# Patient Record
Sex: FEMALE | Race: WHITE | Employment: OTHER | URBAN - NONMETROPOLITAN AREA
[De-identification: names, ages, dates, MRNs, and addresses within clinical notes are randomized per-mention and may not be internally consistent; named-entity substitution may affect disease eponyms.]

---

## 2017-01-16 ENCOUNTER — ANTI-COAG VISIT (OUTPATIENT)
Dept: OTHER | Age: 67
End: 2017-01-16

## 2017-01-16 VITALS
BODY MASS INDEX: 31.16 KG/M2 | WEIGHT: 205.6 LBS | DIASTOLIC BLOOD PRESSURE: 65 MMHG | SYSTOLIC BLOOD PRESSURE: 118 MMHG | HEART RATE: 72 BPM

## 2017-01-16 DIAGNOSIS — I82.409 DVT, RECURRENT, LOWER EXTREMITY, ACUTE, UNSPECIFIED LATERALITY (HCC): ICD-10-CM

## 2017-01-16 LAB — POC INR: 1.2 (ref 0.8–1.2)

## 2017-01-16 PROCEDURE — 85610 PROTHROMBIN TIME: CPT | Performed by: NURSE PRACTITIONER

## 2017-01-16 PROCEDURE — 99999 PR OFFICE/OUTPT VISIT,PROCEDURE ONLY: CPT | Performed by: NURSE PRACTITIONER

## 2017-01-16 ASSESSMENT — ENCOUNTER SYMPTOMS
SHORTNESS OF BREATH: 0
DIARRHEA: 0
BLOOD IN STOOL: 0
CONSTIPATION: 0

## 2017-01-23 ENCOUNTER — ANTI-COAG VISIT (OUTPATIENT)
Dept: OTHER | Age: 67
End: 2017-01-23

## 2017-01-23 VITALS
HEART RATE: 67 BPM | SYSTOLIC BLOOD PRESSURE: 128 MMHG | DIASTOLIC BLOOD PRESSURE: 70 MMHG | BODY MASS INDEX: 31.25 KG/M2 | WEIGHT: 206.2 LBS

## 2017-01-23 DIAGNOSIS — I82.409 DVT, RECURRENT, LOWER EXTREMITY, ACUTE, UNSPECIFIED LATERALITY (HCC): ICD-10-CM

## 2017-01-23 LAB — POC INR: 2.7 (ref 0.8–1.2)

## 2017-01-23 PROCEDURE — 85610 PROTHROMBIN TIME: CPT | Performed by: NURSE PRACTITIONER

## 2017-01-23 PROCEDURE — 99999 PR OFFICE/OUTPT VISIT,PROCEDURE ONLY: CPT | Performed by: NURSE PRACTITIONER

## 2017-01-23 ASSESSMENT — ENCOUNTER SYMPTOMS
SHORTNESS OF BREATH: 0
BLOOD IN STOOL: 0
DIARRHEA: 0
CONSTIPATION: 0

## 2017-02-07 ENCOUNTER — ANTI-COAG VISIT (OUTPATIENT)
Dept: OTHER | Age: 67
End: 2017-02-07

## 2017-02-07 VITALS
SYSTOLIC BLOOD PRESSURE: 120 MMHG | BODY MASS INDEX: 31.46 KG/M2 | WEIGHT: 207.6 LBS | DIASTOLIC BLOOD PRESSURE: 67 MMHG | HEART RATE: 76 BPM

## 2017-02-07 DIAGNOSIS — I82.409 DVT, RECURRENT, LOWER EXTREMITY, ACUTE, UNSPECIFIED LATERALITY (HCC): ICD-10-CM

## 2017-02-07 DIAGNOSIS — R79.1 SUBTHERAPEUTIC INTERNATIONAL NORMALIZED RATIO (INR): Primary | ICD-10-CM

## 2017-02-07 LAB — POC INR: 1.7 (ref 0.8–1.2)

## 2017-02-07 PROCEDURE — 85610 PROTHROMBIN TIME: CPT | Performed by: NURSE PRACTITIONER

## 2017-02-07 PROCEDURE — 99212 OFFICE O/P EST SF 10 MIN: CPT | Performed by: NURSE PRACTITIONER

## 2017-02-07 ASSESSMENT — ENCOUNTER SYMPTOMS
DIARRHEA: 0
SHORTNESS OF BREATH: 0
CONSTIPATION: 0
BLOOD IN STOOL: 0

## 2017-02-28 ENCOUNTER — ANTI-COAG VISIT (OUTPATIENT)
Dept: OTHER | Age: 67
End: 2017-02-28

## 2017-02-28 VITALS
SYSTOLIC BLOOD PRESSURE: 124 MMHG | HEART RATE: 67 BPM | WEIGHT: 209.7 LBS | BODY MASS INDEX: 31.78 KG/M2 | DIASTOLIC BLOOD PRESSURE: 73 MMHG

## 2017-02-28 DIAGNOSIS — I82.409 DVT, RECURRENT, LOWER EXTREMITY, ACUTE, UNSPECIFIED LATERALITY (HCC): ICD-10-CM

## 2017-02-28 LAB — POC INR: 2.6 (ref 0.8–1.2)

## 2017-02-28 PROCEDURE — 99212 OFFICE O/P EST SF 10 MIN: CPT | Performed by: NURSE PRACTITIONER

## 2017-02-28 PROCEDURE — 85610 PROTHROMBIN TIME: CPT | Performed by: NURSE PRACTITIONER

## 2017-02-28 ASSESSMENT — ENCOUNTER SYMPTOMS
DIARRHEA: 0
SHORTNESS OF BREATH: 0
BLOOD IN STOOL: 0
CONSTIPATION: 0

## 2017-03-22 DIAGNOSIS — I82.409 DVT, RECURRENT, LOWER EXTREMITY, ACUTE, UNSPECIFIED LATERALITY (HCC): Primary | ICD-10-CM

## 2017-03-28 ENCOUNTER — ANTI-COAG VISIT (OUTPATIENT)
Dept: OTHER | Age: 67
End: 2017-03-28

## 2017-03-28 VITALS
HEART RATE: 80 BPM | WEIGHT: 205 LBS | SYSTOLIC BLOOD PRESSURE: 132 MMHG | DIASTOLIC BLOOD PRESSURE: 69 MMHG | BODY MASS INDEX: 31.07 KG/M2

## 2017-03-28 DIAGNOSIS — I82.409 DVT, RECURRENT, LOWER EXTREMITY, ACUTE, UNSPECIFIED LATERALITY (HCC): ICD-10-CM

## 2017-03-28 LAB
HCT VFR BLD CALC: 38.2 % (ref 37–47)
HEMOGLOBIN: 13 GM/DL (ref 12–16)
INR BLD: 4.45 (ref 0.85–1.13)

## 2017-03-28 ASSESSMENT — ENCOUNTER SYMPTOMS
CONSTIPATION: 0
SHORTNESS OF BREATH: 0
BLOOD IN STOOL: 0

## 2017-03-30 ENCOUNTER — ANTI-COAG VISIT (OUTPATIENT)
Dept: OTHER | Age: 67
End: 2017-03-30

## 2017-03-30 VITALS
WEIGHT: 204 LBS | HEART RATE: 68 BPM | DIASTOLIC BLOOD PRESSURE: 76 MMHG | BODY MASS INDEX: 30.92 KG/M2 | SYSTOLIC BLOOD PRESSURE: 152 MMHG

## 2017-03-30 DIAGNOSIS — I82.409 DVT, RECURRENT, LOWER EXTREMITY, ACUTE, UNSPECIFIED LATERALITY (HCC): ICD-10-CM

## 2017-03-30 LAB — POC INR: 3.5 (ref 0.8–1.2)

## 2017-03-30 ASSESSMENT — ENCOUNTER SYMPTOMS
CONSTIPATION: 0
SHORTNESS OF BREATH: 0
DIARRHEA: 0
BLOOD IN STOOL: 0

## 2017-04-06 ENCOUNTER — ANTI-COAG VISIT (OUTPATIENT)
Dept: OTHER | Age: 67
End: 2017-04-06

## 2017-04-06 VITALS
SYSTOLIC BLOOD PRESSURE: 129 MMHG | HEART RATE: 73 BPM | WEIGHT: 209.3 LBS | BODY MASS INDEX: 31.72 KG/M2 | DIASTOLIC BLOOD PRESSURE: 69 MMHG

## 2017-04-06 DIAGNOSIS — I82.409 DVT, RECURRENT, LOWER EXTREMITY, ACUTE, UNSPECIFIED LATERALITY (HCC): ICD-10-CM

## 2017-04-06 LAB — POC INR: 2.4 (ref 0.8–1.2)

## 2017-04-06 ASSESSMENT — ENCOUNTER SYMPTOMS: SHORTNESS OF BREATH: 1

## 2017-04-20 ENCOUNTER — ANTI-COAG VISIT (OUTPATIENT)
Dept: OTHER | Age: 67
End: 2017-04-20

## 2017-04-20 VITALS
SYSTOLIC BLOOD PRESSURE: 144 MMHG | DIASTOLIC BLOOD PRESSURE: 86 MMHG | HEART RATE: 74 BPM | BODY MASS INDEX: 31.1 KG/M2 | WEIGHT: 205.2 LBS

## 2017-04-20 DIAGNOSIS — I82.409 DVT, RECURRENT, LOWER EXTREMITY, ACUTE, UNSPECIFIED LATERALITY (HCC): ICD-10-CM

## 2017-04-20 LAB — POC INR: 2.9 (ref 0.8–1.2)

## 2017-04-20 ASSESSMENT — ENCOUNTER SYMPTOMS
SHORTNESS OF BREATH: 1
CONSTIPATION: 0
BLOOD IN STOOL: 0
DIARRHEA: 0

## 2017-05-11 ENCOUNTER — ANTI-COAG VISIT (OUTPATIENT)
Dept: OTHER | Age: 67
End: 2017-05-11

## 2017-05-11 VITALS
SYSTOLIC BLOOD PRESSURE: 139 MMHG | HEART RATE: 80 BPM | BODY MASS INDEX: 30.71 KG/M2 | DIASTOLIC BLOOD PRESSURE: 86 MMHG | WEIGHT: 202.6 LBS

## 2017-05-11 DIAGNOSIS — I82.409 DVT, RECURRENT, LOWER EXTREMITY, ACUTE, UNSPECIFIED LATERALITY (HCC): ICD-10-CM

## 2017-05-11 LAB — POC INR: 2.1 (ref 0.8–1.2)

## 2017-05-11 ASSESSMENT — ENCOUNTER SYMPTOMS
SHORTNESS OF BREATH: 0
DIARRHEA: 0
CONSTIPATION: 0

## 2017-06-01 ENCOUNTER — ANTI-COAG VISIT (OUTPATIENT)
Dept: OTHER | Age: 67
End: 2017-06-01

## 2017-06-01 VITALS
DIASTOLIC BLOOD PRESSURE: 69 MMHG | SYSTOLIC BLOOD PRESSURE: 126 MMHG | BODY MASS INDEX: 30.61 KG/M2 | HEART RATE: 79 BPM | WEIGHT: 202 LBS

## 2017-06-01 DIAGNOSIS — I82.409 DVT, RECURRENT, LOWER EXTREMITY, ACUTE, UNSPECIFIED LATERALITY (HCC): ICD-10-CM

## 2017-06-01 LAB — POC INR: 3 (ref 0.8–1.2)

## 2017-06-01 RX ORDER — TOPIRAMATE 25 MG/1
50 TABLET ORAL 2 TIMES DAILY
COMMUNITY

## 2017-06-01 ASSESSMENT — ENCOUNTER SYMPTOMS
BLOOD IN STOOL: 0
CONSTIPATION: 0
DIARRHEA: 0
SHORTNESS OF BREATH: 0

## 2017-06-29 ENCOUNTER — ANTI-COAG VISIT (OUTPATIENT)
Dept: OTHER | Age: 67
End: 2017-06-29

## 2017-06-29 VITALS
BODY MASS INDEX: 30.46 KG/M2 | WEIGHT: 201 LBS | SYSTOLIC BLOOD PRESSURE: 122 MMHG | HEART RATE: 70 BPM | DIASTOLIC BLOOD PRESSURE: 75 MMHG

## 2017-06-29 DIAGNOSIS — I82.409 DVT, RECURRENT, LOWER EXTREMITY, ACUTE, UNSPECIFIED LATERALITY (HCC): ICD-10-CM

## 2017-06-29 LAB — POC INR: 3.6 (ref 0.8–1.2)

## 2017-06-29 ASSESSMENT — ENCOUNTER SYMPTOMS
DIARRHEA: 0
SHORTNESS OF BREATH: 0
BLOOD IN STOOL: 0
CONSTIPATION: 0

## 2017-07-19 ENCOUNTER — HOSPITAL ENCOUNTER (OUTPATIENT)
Dept: PHARMACY | Age: 67
Setting detail: THERAPIES SERIES
Discharge: HOME | End: 2017-07-19
Payer: MEDICARE

## 2017-07-19 VITALS
HEART RATE: 69 BPM | BODY MASS INDEX: 30.92 KG/M2 | DIASTOLIC BLOOD PRESSURE: 72 MMHG | SYSTOLIC BLOOD PRESSURE: 135 MMHG | WEIGHT: 204 LBS

## 2017-07-19 DIAGNOSIS — I82.409 DVT, RECURRENT, LOWER EXTREMITY, ACUTE, UNSPECIFIED LATERALITY (HCC): ICD-10-CM

## 2017-07-19 LAB — POC INR: 3.2 (ref 0.8–1.2)

## 2017-07-19 PROCEDURE — 85610 PROTHROMBIN TIME: CPT

## 2017-07-19 PROCEDURE — 99211 OFF/OP EST MAY X REQ PHY/QHP: CPT

## 2017-07-19 PROCEDURE — 36416 COLLJ CAPILLARY BLOOD SPEC: CPT

## 2017-07-19 ASSESSMENT — ENCOUNTER SYMPTOMS
SHORTNESS OF BREATH: 0
DIARRHEA: 0
BLOOD IN STOOL: 0
CONSTIPATION: 0

## 2017-08-02 ENCOUNTER — HOSPITAL ENCOUNTER (OUTPATIENT)
Dept: PHARMACY | Age: 67
Setting detail: THERAPIES SERIES
Discharge: HOME | End: 2017-08-02
Payer: MEDICARE

## 2017-08-02 VITALS
SYSTOLIC BLOOD PRESSURE: 150 MMHG | WEIGHT: 201.8 LBS | DIASTOLIC BLOOD PRESSURE: 70 MMHG | BODY MASS INDEX: 30.58 KG/M2 | HEART RATE: 74 BPM

## 2017-08-02 DIAGNOSIS — I82.409 DVT, RECURRENT, LOWER EXTREMITY, ACUTE, UNSPECIFIED LATERALITY (HCC): ICD-10-CM

## 2017-08-02 LAB — POC INR: 2.7 (ref 0.8–1.2)

## 2017-08-02 PROCEDURE — 99211 OFF/OP EST MAY X REQ PHY/QHP: CPT

## 2017-08-02 PROCEDURE — 36416 COLLJ CAPILLARY BLOOD SPEC: CPT

## 2017-08-02 PROCEDURE — 85610 PROTHROMBIN TIME: CPT

## 2017-08-02 ASSESSMENT — ENCOUNTER SYMPTOMS
SHORTNESS OF BREATH: 0
DIARRHEA: 0
BLOOD IN STOOL: 0
CONSTIPATION: 0

## 2017-08-06 DIAGNOSIS — I82.409 DVT, RECURRENT, LOWER EXTREMITY, ACUTE, UNSPECIFIED LATERALITY (HCC): ICD-10-CM

## 2017-08-07 RX ORDER — WARFARIN SODIUM 4 MG/1
TABLET ORAL
Qty: 40 TABLET | Refills: 11 | Status: SHIPPED | OUTPATIENT
Start: 2017-08-07 | End: 2018-04-18 | Stop reason: SDUPTHER

## 2017-08-07 RX ORDER — WARFARIN SODIUM 4 MG/1
TABLET ORAL
Qty: 40 TABLET | Refills: 10 | OUTPATIENT
Start: 2017-08-07

## 2017-08-07 RX ORDER — WARFARIN SODIUM 3 MG/1
TABLET ORAL
Qty: 30 TABLET | Refills: 10 | OUTPATIENT
Start: 2017-08-07

## 2017-08-23 ENCOUNTER — HOSPITAL ENCOUNTER (OUTPATIENT)
Dept: PHARMACY | Age: 67
Setting detail: THERAPIES SERIES
Discharge: HOME | End: 2017-08-23
Payer: MEDICARE

## 2017-08-23 VITALS
WEIGHT: 202.8 LBS | SYSTOLIC BLOOD PRESSURE: 137 MMHG | BODY MASS INDEX: 30.74 KG/M2 | DIASTOLIC BLOOD PRESSURE: 75 MMHG | HEART RATE: 80 BPM

## 2017-08-23 DIAGNOSIS — I82.409 DVT, RECURRENT, LOWER EXTREMITY, ACUTE, UNSPECIFIED LATERALITY (HCC): ICD-10-CM

## 2017-08-23 LAB — POC INR: 4 (ref 0.8–1.2)

## 2017-08-23 PROCEDURE — 36416 COLLJ CAPILLARY BLOOD SPEC: CPT

## 2017-08-23 PROCEDURE — 85610 PROTHROMBIN TIME: CPT

## 2017-08-23 PROCEDURE — 99211 OFF/OP EST MAY X REQ PHY/QHP: CPT

## 2017-08-23 ASSESSMENT — ENCOUNTER SYMPTOMS
CONSTIPATION: 0
SHORTNESS OF BREATH: 0
BLOOD IN STOOL: 0
DIARRHEA: 0

## 2017-09-06 ENCOUNTER — HOSPITAL ENCOUNTER (OUTPATIENT)
Dept: PHARMACY | Age: 67
Setting detail: THERAPIES SERIES
Discharge: HOME | End: 2017-09-06
Payer: MEDICARE

## 2017-09-06 VITALS
SYSTOLIC BLOOD PRESSURE: 131 MMHG | WEIGHT: 201.6 LBS | HEART RATE: 72 BPM | DIASTOLIC BLOOD PRESSURE: 69 MMHG | BODY MASS INDEX: 30.55 KG/M2

## 2017-09-06 DIAGNOSIS — I82.409 DVT, RECURRENT, LOWER EXTREMITY, ACUTE, UNSPECIFIED LATERALITY (HCC): ICD-10-CM

## 2017-09-06 LAB — POC INR: 2.3 (ref 0.8–1.2)

## 2017-09-06 PROCEDURE — 99211 OFF/OP EST MAY X REQ PHY/QHP: CPT

## 2017-09-06 PROCEDURE — 85610 PROTHROMBIN TIME: CPT

## 2017-09-06 PROCEDURE — 36416 COLLJ CAPILLARY BLOOD SPEC: CPT

## 2017-09-27 ENCOUNTER — HOSPITAL ENCOUNTER (OUTPATIENT)
Dept: PHARMACY | Age: 67
Setting detail: THERAPIES SERIES
Discharge: HOME | End: 2017-09-27
Payer: MEDICARE

## 2017-09-27 VITALS
SYSTOLIC BLOOD PRESSURE: 119 MMHG | BODY MASS INDEX: 30.34 KG/M2 | WEIGHT: 200.2 LBS | DIASTOLIC BLOOD PRESSURE: 66 MMHG | HEART RATE: 68 BPM

## 2017-09-27 DIAGNOSIS — I82.409 DVT, RECURRENT, LOWER EXTREMITY, ACUTE, UNSPECIFIED LATERALITY (HCC): ICD-10-CM

## 2017-09-27 LAB
HCT VFR BLD CALC: 39.2 % (ref 37–47)
HEMOGLOBIN: 13.3 GM/DL (ref 12–16)
POC INR: 1.9 (ref 0.8–1.2)

## 2017-09-27 PROCEDURE — 99211 OFF/OP EST MAY X REQ PHY/QHP: CPT

## 2017-09-27 PROCEDURE — 36416 COLLJ CAPILLARY BLOOD SPEC: CPT

## 2017-09-27 PROCEDURE — 85610 PROTHROMBIN TIME: CPT

## 2017-10-11 ENCOUNTER — HOSPITAL ENCOUNTER (OUTPATIENT)
Dept: PHARMACY | Age: 67
Setting detail: THERAPIES SERIES
Discharge: HOME | End: 2017-10-11
Payer: MEDICARE

## 2017-10-11 VITALS
DIASTOLIC BLOOD PRESSURE: 71 MMHG | WEIGHT: 200 LBS | BODY MASS INDEX: 30.31 KG/M2 | SYSTOLIC BLOOD PRESSURE: 117 MMHG | HEART RATE: 79 BPM

## 2017-10-11 DIAGNOSIS — I82.409 DVT, RECURRENT, LOWER EXTREMITY, ACUTE, UNSPECIFIED LATERALITY (HCC): ICD-10-CM

## 2017-10-11 LAB — POC INR: 1.9 (ref 0.8–1.2)

## 2017-10-11 PROCEDURE — 85610 PROTHROMBIN TIME: CPT

## 2017-10-11 PROCEDURE — 99211 OFF/OP EST MAY X REQ PHY/QHP: CPT

## 2017-10-11 PROCEDURE — 36416 COLLJ CAPILLARY BLOOD SPEC: CPT

## 2017-10-25 ENCOUNTER — HOSPITAL ENCOUNTER (OUTPATIENT)
Dept: PHARMACY | Age: 67
Setting detail: THERAPIES SERIES
Discharge: HOME | End: 2017-10-25
Payer: MEDICARE

## 2017-10-25 VITALS
HEART RATE: 72 BPM | SYSTOLIC BLOOD PRESSURE: 134 MMHG | WEIGHT: 198.8 LBS | TEMPERATURE: 96.7 F | DIASTOLIC BLOOD PRESSURE: 77 MMHG | BODY MASS INDEX: 30.13 KG/M2

## 2017-10-25 DIAGNOSIS — I82.409 RECURRENT ACUTE DEEP VEIN THROMBOSIS (DVT) OF LOWER EXTREMITY, UNSPECIFIED LATERALITY (HCC): ICD-10-CM

## 2017-10-25 LAB — POC INR: 1.9 (ref 0.8–1.2)

## 2017-10-25 PROCEDURE — 36416 COLLJ CAPILLARY BLOOD SPEC: CPT

## 2017-10-25 PROCEDURE — G0463 HOSPITAL OUTPT CLINIC VISIT: HCPCS

## 2017-10-25 PROCEDURE — 90686 IIV4 VACC NO PRSV 0.5 ML IM: CPT | Performed by: INTERNAL MEDICINE

## 2017-10-25 PROCEDURE — 6360000002 HC RX W HCPCS: Performed by: INTERNAL MEDICINE

## 2017-10-25 PROCEDURE — G0008 ADMIN INFLUENZA VIRUS VAC: HCPCS | Performed by: INTERNAL MEDICINE

## 2017-10-25 PROCEDURE — 85610 PROTHROMBIN TIME: CPT

## 2017-10-25 RX ADMIN — INFLUENZA A VIRUS A/SINGAPORE/GP1908/2015 IVR-180A (H1N1) ANTIGEN (PROPIOLACTONE INACTIVATED), INFLUENZA A VIRUS A/HONG KONG/4801/2014 X-263B (H3N2) ANTIGEN (PROPIOLACTONE INACTIVATED), INFLUENZA B VIRUS B/BRISBANE/46/2015 ANTIGEN (PROPIOLACTONE INACTIVATED), AND INFLUENZA B VIRUS B/PHUKET/3073/2013 BVR-1B ANTIGEN (PROPIOLACTONE INACTIVATED) 0.5 ML: 15; 15; 15; 15 INJECTION, SUSPENSION INTRAMUSCULAR at 11:37

## 2017-10-25 NOTE — PROGRESS NOTES
The Medication Management [facility]  Anticoagulation Clinic  [phone] (phone)                       [phone] (fax)    Visit Date: 10/25/2017     Subjective:     Patient presents for 2 week INR check. Patient in for anticoagulation management due to DVT, PE and noted chronic lacunar infarcts on MRI of brain with a goal INR of 2.0-3.0. INR ordered and reviewed today. Patient reports the following:    Medication changes: no  Tablet strength per patient: 3mg, 4mg  Patient reported dosing regimen over last 1 week: 8mg daily  Missed doses in the last 1-2 weeks: no  Extra doses in the last 1-2 weeks: no  Any problems with bleeding/bruising? No  Any recent falls? No   Any signs or symptoms of DVT/PE or stroke? No  Alcohol use: no  Tobacco use: no  Diet changes as follows: No changes  Upcoming surgeries or procedures: no  Appointment preference: AM around 11AM     Review of Systems   Constitutional: Negative for activity change, appetite change and fatigue. HENT: Negative for nosebleeds. Respiratory: Negative for shortness of breath. Cardiovascular: Negative for chest pain and leg swelling. Gastrointestinal: Negative for blood in stool, constipation and diarrhea. Genitourinary: Negative for hematuria. Neurological: Negative for weakness, light-headedness and headaches. Hematological: Does not bruise/bleed easily. Objective:   Physical Exam   Vitals:    10/25/17 1104   BP: 134/77   Pulse: 72   Temp: 96.7 °F (35.9 °C)     H&H reviewed    Physical Exam    Assessment:     Lab Results   Component Value Date    INR 1.90 (H) 10/25/2017    INR 1.90 (H) 10/11/2017    INR 1.90 (H) 09/27/2017     INR subtherapeutic   Recent Labs      10/25/17   1100   INR  1.90*             Plan:   Increase Coumadin 10 mg MF, 8 mg TWThSaS. Recheck INR 2 weeks. Patient reminded to call the Anticoagulation Clinic with signs or symptoms of bleeding or with any medication changes.  Patient given instructions utilizing the teach back method. After obtaining consent, Influenza vaccine given by Wang Hudson. Patient instructed to remain in clinic for 20 minutes afterwards, and to report any adverse reaction to staff immediately. [facility] Vaccine Information Sheet given to patient for immunization(s) administered. Patient tolerated well, please see immunization note. Discharged ambulatory in no apparent distress.

## 2017-11-02 ENCOUNTER — TELEPHONE (OUTPATIENT)
Dept: PHARMACY | Age: 67
End: 2017-11-02

## 2017-11-08 ENCOUNTER — HOSPITAL ENCOUNTER (OUTPATIENT)
Dept: PHARMACY | Age: 67
Setting detail: THERAPIES SERIES
Discharge: HOME | End: 2017-11-08
Payer: MEDICARE

## 2017-11-08 VITALS
BODY MASS INDEX: 30.04 KG/M2 | DIASTOLIC BLOOD PRESSURE: 73 MMHG | WEIGHT: 198.2 LBS | HEART RATE: 66 BPM | SYSTOLIC BLOOD PRESSURE: 137 MMHG

## 2017-11-08 DIAGNOSIS — I82.409 RECURRENT ACUTE DEEP VEIN THROMBOSIS (DVT) OF LOWER EXTREMITY, UNSPECIFIED LATERALITY (HCC): ICD-10-CM

## 2017-11-08 LAB — POC INR: 2.8 (ref 0.8–1.2)

## 2017-11-08 PROCEDURE — 99211 OFF/OP EST MAY X REQ PHY/QHP: CPT

## 2017-11-08 PROCEDURE — 85610 PROTHROMBIN TIME: CPT

## 2017-11-08 PROCEDURE — 36416 COLLJ CAPILLARY BLOOD SPEC: CPT

## 2017-11-08 ASSESSMENT — ENCOUNTER SYMPTOMS
DIARRHEA: 0
BLOOD IN STOOL: 0
CONSTIPATION: 0
SHORTNESS OF BREATH: 0

## 2017-11-08 NOTE — PROGRESS NOTES
The Medication Management [facility]  Anticoagulation Clinic  [phone] (phone)           [phone] (fax)    Visit Date: 11/8/2017     Subjective:       Patient ID: Stevie Peterson, 78 y.o., female    HPI  Patient seen in clinic for anticoagulation management due to recurrent DVT with a goal INR of 2.0-3.0. Patient last seen 2 week(s) ago. INR ordered and reviewed today. Patient denies any new Rx medications. Patient denies any OTC medications or products. Patient denies any missed doses. Patient denies change in diet. Patient denies change in tobacco/alcohol use. Patient denies upcoming surgeries. Review of Systems   Constitutional: Negative for activity change, appetite change and fatigue. HENT: Negative for nosebleeds. Respiratory: Negative for shortness of breath. Cardiovascular: Negative for chest pain and leg swelling. Gastrointestinal: Negative for blood in stool, constipation and diarrhea. Genitourinary: Negative for hematuria. Neurological: Negative for weakness, light-headedness and headaches. Hematological: Does not bruise/bleed easily.        Objective:   Physical Exam   Vitals:    11/08/17 1336   BP: 137/73   Pulse: 66       Physical Exam    Assessment:      Lab Results   Component Value Date    INR 2.80 (H) 11/08/2017    INR 1.90 (H) 10/25/2017    INR 1.90 (H) 10/11/2017     INR therapeutic   Recent Labs      11/08/17   1335   INR  2.80*                             Plan:    Continue Coumadin 10 mg MF, 8 mg TWThSaS. Recheck INR 4 weeks. Patient reminded to call the Anticoagulation Clinic with any signs or symptoms of bleeding or with any medication changes. Patient given instructions utilizing the teach back method. Discharged ambulatory in no apparent distress.

## 2017-12-06 ENCOUNTER — HOSPITAL ENCOUNTER (OUTPATIENT)
Dept: PHARMACY | Age: 67
Setting detail: THERAPIES SERIES
Discharge: HOME | End: 2017-12-06
Payer: MEDICARE

## 2017-12-06 DIAGNOSIS — I82.409 RECURRENT ACUTE DEEP VEIN THROMBOSIS (DVT) OF LOWER EXTREMITY, UNSPECIFIED LATERALITY (HCC): ICD-10-CM

## 2017-12-06 LAB — POC INR: 2.2 (ref 0.8–1.2)

## 2017-12-06 PROCEDURE — 36416 COLLJ CAPILLARY BLOOD SPEC: CPT

## 2017-12-06 PROCEDURE — 99211 OFF/OP EST MAY X REQ PHY/QHP: CPT

## 2017-12-06 PROCEDURE — 85610 PROTHROMBIN TIME: CPT

## 2017-12-06 ASSESSMENT — ENCOUNTER SYMPTOMS
DIARRHEA: 0
BLOOD IN STOOL: 0
CONSTIPATION: 0
SHORTNESS OF BREATH: 0

## 2017-12-06 NOTE — PROGRESS NOTES
The Medication Management [facility]  Anticoagulation Clinic  [phone] (phone)           [phone] (fax)    Visit Date: 12/6/2017     Subjective:       Patient ID: Анна Arteaga, 75 y.o., female    HPI  Patient seen in clinic for Warfarin management due to recurrent DVT, per Dr. [facility] referral (4 week visit). Correctly identified tablets; follows printed instructions for dose. Missed a dose 1 day last week and week before - unsure what days. Patient denies any new Rx medications. Patient denies any OTC medications or products. Patient denies change in diet. Patient denies change in tobacco/alcohol use. Patient denies upcoming surgeries. Fell onto face last week into mulch. Reminded to go to ER after any head trauma. States is having more balance trouble. Review of Systems   Constitutional: Negative for activity change, appetite change and fatigue. HENT: Negative for nosebleeds. Respiratory: Negative for shortness of breath. Cardiovascular: Positive for chest pain (a few episodes left chest, slight, can last 2 hours. Advised to notify doctor. ). Leg swelling: left, usual.   Gastrointestinal: Negative for blood in stool, constipation and diarrhea. Genitourinary: Negative for hematuria. Neurological: Positive for dizziness (one day). Negative for weakness, light-headedness and headaches. Hematological: Does not bruise/bleed easily.        Objective:         Physical Exam   Constitutional: She is oriented to person, place, and time. She appears well-developed and well-nourished. Pulmonary/Chest: Effort normal.   Neurological: She is alert and oriented to person, place, and time. Skin: Skin is warm and dry. Psychiatric: She has a normal mood and affect. Her behavior is normal.       Assessment:      Lab Results   Component Value Date    INR 2.20 (H) 12/06/2017    INR 2.80 (H) 11/08/2017    INR 1.90 (H) 10/25/2017     INR therapeutic  - goal 2-3.   Recent Labs      12/06/17   1135   INR  2.20*                             Plan:   POCT INR ordered/perfomed/reviewed. Continue PO Coumadin 10 mg MF, 8 mg TWThSS. Recheck INR 4 weeks. (Report given - orders entered by RHETT Velasquez, PharmD.)  Patient reminded to call the Anticoagulation Clinic with any signs or symptoms of bleeding or with any medication changes. Patient given instructions utilizing the teach back method. Discharged ambulatory in no apparent distress. Frequency of office visits not marked on referral.  Called Dr. Dominic Deal office - spoke with nurse, Juan Pablo Burciaga. She advised every 6 months. Referral so marked.

## 2018-01-29 ENCOUNTER — TELEPHONE (OUTPATIENT)
Dept: PULMONOLOGY | Age: 68
End: 2018-01-29

## 2018-01-31 ENCOUNTER — HOSPITAL ENCOUNTER (OUTPATIENT)
Dept: PHARMACY | Age: 68
Setting detail: THERAPIES SERIES
Discharge: HOME | End: 2018-01-31
Payer: MEDICARE

## 2018-01-31 DIAGNOSIS — I82.409 RECURRENT ACUTE DEEP VEIN THROMBOSIS (DVT) OF LOWER EXTREMITY, UNSPECIFIED LATERALITY (HCC): ICD-10-CM

## 2018-01-31 LAB — POC INR: 2.2 (ref 0.8–1.2)

## 2018-01-31 PROCEDURE — 36416 COLLJ CAPILLARY BLOOD SPEC: CPT

## 2018-01-31 PROCEDURE — 85610 PROTHROMBIN TIME: CPT

## 2018-01-31 PROCEDURE — 99211 OFF/OP EST MAY X REQ PHY/QHP: CPT

## 2018-02-28 ENCOUNTER — HOSPITAL ENCOUNTER (OUTPATIENT)
Dept: PHARMACY | Age: 68
Setting detail: THERAPIES SERIES
Discharge: HOME | End: 2018-02-28
Payer: MEDICARE

## 2018-02-28 DIAGNOSIS — I82.409 RECURRENT ACUTE DEEP VEIN THROMBOSIS (DVT) OF LOWER EXTREMITY, UNSPECIFIED LATERALITY (HCC): ICD-10-CM

## 2018-02-28 LAB — POC INR: 3.2 (ref 0.8–1.2)

## 2018-02-28 PROCEDURE — 36416 COLLJ CAPILLARY BLOOD SPEC: CPT

## 2018-02-28 PROCEDURE — 85610 PROTHROMBIN TIME: CPT

## 2018-02-28 PROCEDURE — 99211 OFF/OP EST MAY X REQ PHY/QHP: CPT

## 2018-03-28 ENCOUNTER — HOSPITAL ENCOUNTER (OUTPATIENT)
Age: 68
Discharge: HOME | End: 2018-03-28
Payer: MEDICARE

## 2018-03-28 ENCOUNTER — HOSPITAL ENCOUNTER (OUTPATIENT)
Dept: PHARMACY | Age: 68
Setting detail: THERAPIES SERIES
Discharge: HOME | End: 2018-03-28
Payer: MEDICARE

## 2018-03-28 DIAGNOSIS — Z79.01 ANTICOAGULATED ON COUMADIN: ICD-10-CM

## 2018-03-28 DIAGNOSIS — I82.409 RECURRENT ACUTE DEEP VEIN THROMBOSIS (DVT) OF LOWER EXTREMITY, UNSPECIFIED LATERALITY (HCC): ICD-10-CM

## 2018-03-28 DIAGNOSIS — Z79.01 ANTICOAGULATED ON COUMADIN: Primary | ICD-10-CM

## 2018-03-28 LAB
HCT VFR BLD CALC: 38.7 % (ref 37–47)
HEMOGLOBIN: 13 GM/DL (ref 12–16)
POC INR: 1.8 (ref 0.8–1.2)

## 2018-03-28 PROCEDURE — 36415 COLL VENOUS BLD VENIPUNCTURE: CPT

## 2018-03-28 PROCEDURE — 36416 COLLJ CAPILLARY BLOOD SPEC: CPT

## 2018-03-28 PROCEDURE — 99211 OFF/OP EST MAY X REQ PHY/QHP: CPT

## 2018-03-28 PROCEDURE — 85014 HEMATOCRIT: CPT

## 2018-03-28 PROCEDURE — 85018 HEMOGLOBIN: CPT

## 2018-03-28 PROCEDURE — 85610 PROTHROMBIN TIME: CPT

## 2018-04-18 ENCOUNTER — HOSPITAL ENCOUNTER (OUTPATIENT)
Dept: PHARMACY | Age: 68
Setting detail: THERAPIES SERIES
Discharge: HOME | End: 2018-04-18
Payer: MEDICARE

## 2018-04-18 DIAGNOSIS — I82.409 RECURRENT ACUTE DEEP VEIN THROMBOSIS (DVT) OF LOWER EXTREMITY, UNSPECIFIED LATERALITY (HCC): ICD-10-CM

## 2018-04-18 DIAGNOSIS — I82.409 DVT, RECURRENT, LOWER EXTREMITY, ACUTE, UNSPECIFIED LATERALITY (HCC): ICD-10-CM

## 2018-04-18 LAB — POC INR: 1.8 (ref 0.8–1.2)

## 2018-04-18 PROCEDURE — 36416 COLLJ CAPILLARY BLOOD SPEC: CPT | Performed by: PHARMACIST

## 2018-04-18 PROCEDURE — 99211 OFF/OP EST MAY X REQ PHY/QHP: CPT | Performed by: PHARMACIST

## 2018-04-18 PROCEDURE — 85610 PROTHROMBIN TIME: CPT | Performed by: PHARMACIST

## 2018-04-18 RX ORDER — WARFARIN SODIUM 4 MG/1
TABLET ORAL
Qty: 60 TABLET | Refills: 11 | Status: SHIPPED | OUTPATIENT
Start: 2018-04-18 | End: 2019-04-10 | Stop reason: SDUPTHER

## 2018-05-23 ENCOUNTER — HOSPITAL ENCOUNTER (OUTPATIENT)
Dept: PHARMACY | Age: 68
Setting detail: THERAPIES SERIES
Discharge: HOME | End: 2018-05-23
Payer: MEDICARE

## 2018-05-23 DIAGNOSIS — I82.409 RECURRENT ACUTE DEEP VEIN THROMBOSIS (DVT) OF LOWER EXTREMITY, UNSPECIFIED LATERALITY (HCC): ICD-10-CM

## 2018-05-23 LAB — POC INR: 2.5 (ref 0.8–1.2)

## 2018-05-23 PROCEDURE — 85610 PROTHROMBIN TIME: CPT

## 2018-05-23 PROCEDURE — 36416 COLLJ CAPILLARY BLOOD SPEC: CPT

## 2018-05-23 PROCEDURE — 99211 OFF/OP EST MAY X REQ PHY/QHP: CPT

## 2018-05-23 RX ORDER — WARFARIN SODIUM 3 MG/1
TABLET ORAL DAILY
COMMUNITY
End: 2019-02-12 | Stop reason: SDUPTHER

## 2018-09-10 ENCOUNTER — HOSPITAL ENCOUNTER (OUTPATIENT)
Dept: PHARMACY | Age: 68
Setting detail: THERAPIES SERIES
Discharge: HOME | End: 2018-09-10
Payer: MEDICARE

## 2018-09-10 DIAGNOSIS — I82.409 RECURRENT ACUTE DEEP VEIN THROMBOSIS (DVT) OF LOWER EXTREMITY, UNSPECIFIED LATERALITY (HCC): ICD-10-CM

## 2018-09-10 LAB — POC INR: 4 (ref 0.8–1.2)

## 2018-09-10 PROCEDURE — 99211 OFF/OP EST MAY X REQ PHY/QHP: CPT

## 2018-09-10 PROCEDURE — 85610 PROTHROMBIN TIME: CPT

## 2018-09-10 PROCEDURE — 36416 COLLJ CAPILLARY BLOOD SPEC: CPT

## 2018-09-24 ENCOUNTER — HOSPITAL ENCOUNTER (OUTPATIENT)
Dept: PHARMACY | Age: 68
Setting detail: THERAPIES SERIES
Discharge: HOME | End: 2018-09-24
Payer: MEDICARE

## 2018-09-24 DIAGNOSIS — I82.409 RECURRENT ACUTE DEEP VEIN THROMBOSIS (DVT) OF LOWER EXTREMITY, UNSPECIFIED LATERALITY (HCC): ICD-10-CM

## 2018-09-24 LAB — POC INR: 1.8 (ref 0.8–1.2)

## 2018-09-24 PROCEDURE — G0008 ADMIN INFLUENZA VIRUS VAC: HCPCS | Performed by: INTERNAL MEDICINE

## 2018-09-24 PROCEDURE — 90686 IIV4 VACC NO PRSV 0.5 ML IM: CPT | Performed by: INTERNAL MEDICINE

## 2018-09-24 PROCEDURE — 6360000002 HC RX W HCPCS: Performed by: INTERNAL MEDICINE

## 2018-09-24 PROCEDURE — 36416 COLLJ CAPILLARY BLOOD SPEC: CPT | Performed by: PHARMACIST

## 2018-09-24 PROCEDURE — G0463 HOSPITAL OUTPT CLINIC VISIT: HCPCS | Performed by: PHARMACIST

## 2018-09-24 PROCEDURE — 85610 PROTHROMBIN TIME: CPT | Performed by: PHARMACIST

## 2018-09-24 RX ORDER — OXYBUTYNIN CHLORIDE 5 MG/1
5 TABLET ORAL 3 TIMES DAILY
COMMUNITY

## 2018-09-24 RX ADMIN — INFLUENZA A VIRUS A/MICHIGAN/45/2015 X-275 (H1N1) ANTIGEN (FORMALDEHYDE INACTIVATED), INFLUENZA A VIRUS A/SINGAPORE/INFIMH-16-0019/2016 IVR-186 (H3N2) ANTIGEN (FORMALDEHYDE INACTIVATED), INFLUENZA B VIRUS B/PHUKET/3073/2013 ANTIGEN (FORMALDEHYDE INACTIVATED), AND INFLUENZA B VIRUS B/MARYLAND/15/2016 BX-69A ANTIGEN (FORMALDEHYDE INACTIVATED) 0.5 ML: 15; 15; 15; 15 INJECTION, SUSPENSION INTRAMUSCULAR at 11:47

## 2018-09-24 NOTE — PROGRESS NOTES
Medication Management [facility]  Anticoagulation Clinic  [phone] (phone)  [phone] (fax)      Ms. Yoshi Munoz is a 75 y.o.  female with history of DVT who presents today for anticoagulation monitoring and adjustment. Patient verifies current dosing regimen and tablet strength. No missed or extra doses. Patient denies s/s bleeding/bruising/swelling/SOB/chest pain  - More bruising than usual.  No blood in urine or stool. No dietary changes. No changes in medication/OTC agents/Herbals. No change in alcohol use or tobacco use. No change in activity level. Patient denies headaches/dizziness/lightheadedness/falls. No vomiting/diarrhea or acute illness. No Procedures scheduled in the future at this time. Assessment:   Lab Results   Component Value Date    INR 1.80 (H) 09/24/2018    INR 4.00 (H) 09/10/2018    INR 2.50 (H) 05/23/2018     INR subtherapeutic   Recent Labs      09/24/18   1103   INR  1.80*         Plan:  Coumadin 12mg today, then continue Coumadin 8mg MWF and 6mg TThSaS . Recheck INR 3 weeks. Fluzone given as per pt request. Patient reminded to call the Anticoagulation Clinic with any signs or symptoms of bleeding or with any medication changes. Patient given instructions utilizing the teach back method. Discharged ambulatory in no apparent distress. After visit summary printed and reviewed with patient.       Medications reviewed and updated on home medication list Yes    Influenza vaccine:     [x] given    [] declined   [] received previously   [] plans to receive at a later time   [] refused    [x] documented in EPIC

## 2018-11-26 ENCOUNTER — HOSPITAL ENCOUNTER (OUTPATIENT)
Dept: PHARMACY | Age: 68
Setting detail: THERAPIES SERIES
Discharge: HOME | End: 2018-11-26
Payer: MEDICARE

## 2018-11-26 DIAGNOSIS — I82.409 RECURRENT ACUTE DEEP VEIN THROMBOSIS (DVT) OF LOWER EXTREMITY, UNSPECIFIED LATERALITY (HCC): ICD-10-CM

## 2018-11-26 LAB — POC INR: 2.5 (ref 0.8–1.2)

## 2018-11-26 PROCEDURE — 99211 OFF/OP EST MAY X REQ PHY/QHP: CPT | Performed by: PHARMACIST

## 2018-11-26 PROCEDURE — 36416 COLLJ CAPILLARY BLOOD SPEC: CPT | Performed by: PHARMACIST

## 2018-11-26 PROCEDURE — 85610 PROTHROMBIN TIME: CPT | Performed by: PHARMACIST

## 2019-01-03 ENCOUNTER — HOSPITAL ENCOUNTER (OUTPATIENT)
Dept: PHARMACY | Age: 69
Setting detail: THERAPIES SERIES
Discharge: HOME | End: 2019-01-03
Payer: MEDICARE

## 2019-01-03 DIAGNOSIS — I82.409 RECURRENT ACUTE DEEP VEIN THROMBOSIS (DVT) OF LOWER EXTREMITY, UNSPECIFIED LATERALITY (HCC): ICD-10-CM

## 2019-01-03 LAB — POC INR: 1.6 (ref 0.8–1.2)

## 2019-01-03 PROCEDURE — 99211 OFF/OP EST MAY X REQ PHY/QHP: CPT

## 2019-01-03 PROCEDURE — 85610 PROTHROMBIN TIME: CPT

## 2019-01-03 PROCEDURE — 36416 COLLJ CAPILLARY BLOOD SPEC: CPT

## 2019-01-30 DIAGNOSIS — I82.409 RECURRENT DEEP VEIN THROMBOSIS (DVT) (HCC): Primary | ICD-10-CM

## 2019-01-30 PROBLEM — N32.81 OVERACTIVE BLADDER: Status: ACTIVE | Noted: 2017-05-03

## 2019-02-08 ENCOUNTER — HOSPITAL ENCOUNTER (OUTPATIENT)
Dept: PHARMACY | Age: 69
Setting detail: THERAPIES SERIES
End: 2019-02-08
Payer: MEDICARE

## 2019-02-12 ENCOUNTER — HOSPITAL ENCOUNTER (OUTPATIENT)
Dept: PHARMACY | Age: 69
Setting detail: THERAPIES SERIES
Discharge: HOME | End: 2019-02-12
Payer: MEDICARE

## 2019-02-12 DIAGNOSIS — I82.409 RECURRENT ACUTE DEEP VEIN THROMBOSIS (DVT) OF LOWER EXTREMITY, UNSPECIFIED LATERALITY (HCC): ICD-10-CM

## 2019-02-12 LAB — POC INR: 2 (ref 0.8–1.2)

## 2019-02-12 PROCEDURE — 36416 COLLJ CAPILLARY BLOOD SPEC: CPT

## 2019-02-12 PROCEDURE — 85610 PROTHROMBIN TIME: CPT

## 2019-02-12 PROCEDURE — 99211 OFF/OP EST MAY X REQ PHY/QHP: CPT

## 2019-02-12 RX ORDER — WARFARIN SODIUM 3 MG/1
TABLET ORAL
Qty: 35 TABLET | Refills: 1 | Status: SHIPPED | OUTPATIENT
Start: 2019-02-12 | End: 2019-05-01 | Stop reason: SDUPTHER

## 2019-03-12 ENCOUNTER — APPOINTMENT (OUTPATIENT)
Dept: PHARMACY | Age: 69
End: 2019-03-12
Payer: MEDICARE

## 2019-03-14 ENCOUNTER — APPOINTMENT (OUTPATIENT)
Dept: PHARMACY | Age: 69
End: 2019-03-14
Payer: MEDICARE

## 2019-03-20 ENCOUNTER — HOSPITAL ENCOUNTER (OUTPATIENT)
Dept: PHARMACY | Age: 69
Setting detail: THERAPIES SERIES
Discharge: HOME | End: 2019-03-20
Payer: MEDICARE

## 2019-03-20 DIAGNOSIS — I82.409 RECURRENT ACUTE DEEP VEIN THROMBOSIS (DVT) OF LOWER EXTREMITY, UNSPECIFIED LATERALITY (HCC): ICD-10-CM

## 2019-03-20 LAB — POC INR: 1.7 (ref 0.8–1.2)

## 2019-03-20 PROCEDURE — 99211 OFF/OP EST MAY X REQ PHY/QHP: CPT

## 2019-03-20 PROCEDURE — 85610 PROTHROMBIN TIME: CPT

## 2019-03-20 PROCEDURE — 36416 COLLJ CAPILLARY BLOOD SPEC: CPT

## 2019-04-10 ENCOUNTER — HOSPITAL ENCOUNTER (OUTPATIENT)
Dept: PHARMACY | Age: 69
Setting detail: THERAPIES SERIES
Discharge: HOME | End: 2019-04-10
Payer: MEDICARE

## 2019-04-10 DIAGNOSIS — I82.409 RECURRENT ACUTE DEEP VEIN THROMBOSIS (DVT) OF LOWER EXTREMITY, UNSPECIFIED LATERALITY (HCC): ICD-10-CM

## 2019-04-10 DIAGNOSIS — I82.409 DVT, RECURRENT, LOWER EXTREMITY, ACUTE, UNSPECIFIED LATERALITY (HCC): ICD-10-CM

## 2019-04-10 LAB — POC INR: 1.8 (ref 0.8–1.2)

## 2019-04-10 PROCEDURE — 36416 COLLJ CAPILLARY BLOOD SPEC: CPT

## 2019-04-10 PROCEDURE — 99212 OFFICE O/P EST SF 10 MIN: CPT

## 2019-04-10 PROCEDURE — 85610 PROTHROMBIN TIME: CPT

## 2019-04-10 RX ORDER — WARFARIN SODIUM 4 MG/1
TABLET ORAL
Qty: 60 TABLET | Refills: 11 | Status: SHIPPED | OUTPATIENT
Start: 2019-04-10 | End: 2020-04-22

## 2019-04-10 RX ORDER — WARFARIN SODIUM 4 MG/1
TABLET ORAL
COMMUNITY
End: 2019-05-01

## 2019-04-10 RX ORDER — LACTOSE-REDUCED FOOD 0.06G-1/ML
1 LIQUID (ML) ORAL
COMMUNITY

## 2019-04-10 NOTE — PROGRESS NOTES
Medication Management [facility]  Anticoagulation Clinic  [phone] (phone)  [phone] (fax)      Ms. Vladislav Chester is a 70 y.o.  female with history of DVT, per Dr. Evert Zaragoza referral, who presents today for Warfarin monitoring and adjustment (3 week visit after increasing dose by 4.2%). Patient verifies current dosing regimen and tablet strengths. No missed or extra doses, except as ordered last visit. Patient denies bleeding/bruising/SOB. Had some chest pain when out of Protonix, but has drug now (she discussed with PCP yesterday). Has usual left leg swelling from old injury. No blood in urine or stool. No dietary changes. Has been drinking Boost for quite some time; reminded to be consistent with it. No changes in medication/OTC agents/herbals. No change in alcohol use or tobacco use. No change in activity level. Patient denies dizziness/lightheadedness/falls. Has had some bad headaches - had run out of Topamax, but has it now. Has used Fioricet. No vomiting/diarrhea or acute illness. No procedures scheduled in the future at this time. Assessment:   Lab Results   Component Value Date    INR 1.80 (H) 04/10/2019    INR 1.70 (H) 03/20/2019    INR 2.00 (H) 02/12/2019     INR subtherapeutic - goal 2-3. Recent Labs     04/10/19  1049   INR 1.80*     Plan:  POCT INR ordered/performed/result reviewed. 8 mg today, W, then increase PO Coumadin to 6 mg W, 8 mg MTThFSS (from 6 mg MWF, 8 mg TThSS=8% increase). Recheck INR in 2 weeks. (Report given - orders entered by [facility] PharmD., who electronically renewed 4 mg prescription.)  Patient reminded to call the Anticoagulation Clinic with signs or symptoms of bleeding or with any medication changes. Patient given instructions utilizing the teach back method. Still has H&H order; plans to do tomorrow at PCP's office. Discharged ambulatory in no apparent distress.         After visit summary printed and reviewed

## 2019-05-01 ENCOUNTER — HOSPITAL ENCOUNTER (OUTPATIENT)
Dept: PHARMACY | Age: 69
Setting detail: THERAPIES SERIES
Discharge: HOME | End: 2019-05-01
Payer: MEDICARE

## 2019-05-01 DIAGNOSIS — I82.409 RECURRENT ACUTE DEEP VEIN THROMBOSIS (DVT) OF LOWER EXTREMITY, UNSPECIFIED LATERALITY (HCC): ICD-10-CM

## 2019-05-01 LAB — POC INR: 2.2 (ref 0.8–1.2)

## 2019-05-01 PROCEDURE — 85610 PROTHROMBIN TIME: CPT

## 2019-05-01 PROCEDURE — 36416 COLLJ CAPILLARY BLOOD SPEC: CPT

## 2019-05-01 PROCEDURE — 99212 OFFICE O/P EST SF 10 MIN: CPT

## 2019-05-01 RX ORDER — WARFARIN SODIUM 3 MG/1
TABLET ORAL
Qty: 35 TABLET | Refills: 1 | Status: SHIPPED | OUTPATIENT
Start: 2019-05-01 | End: 2020-03-10

## 2019-05-01 NOTE — PROGRESS NOTES
Medication Management [facility]  Anticoagulation Clinic  [phone] (phone)  [phone] (fax)      Ms. Kaylah Gr is a 70 y.o.  female with history of recurrent DVT, per Dr. Burns Seen referral, who presents today for Warfarin monitoring and adjustment (1 week late for 2 week visit after increasing dose by 8% - second change in a row). Patient verifies current tablet strengths. Followed printed instructions for dose. No missed or extra doses. Patient denies bleeding/bruising/swelling/SOB. Had chest pain one night when under increased stress. No blood in urine or stool. No dietary changes. No changes in medication/OTC agents/herbals. No change in alcohol use or tobacco use. No change in activity level. Patient denies dizziness/lightheadedness/falls. Having fewer headaches; uses Topamax and Fioricet. Balance is better. No vomiting/diarrhea or acute illness. No procedures scheduled in the future at this time. Assessment:   Lab Results   Component Value Date    INR 2.20 (H) 05/01/2019    INR 1.80 (H) 04/10/2019    INR 1.70 (H) 03/20/2019     INR therapeutic - goal 2-3. Recent Labs     05/01/19  1512   INR 2.20*       Plan:  POCT INR ordered/performed/result reviewed. Continue PO Coumadin 6 mg W, 8 mg MTThFSS. Recheck INR in 4 weeks. Patient reminded to call the Anticoagulation Clinic with any signs or symptoms of bleeding or with any medication changes. Patient given instructions utilizing the teach back method. Again given routine H&H order; also has labwork to do for doctor. Discharged ambulatory in no apparent distress. 3 mg prescription renewed electronically by Ricardo Flor, PharmD. After visit summary printed and reviewed with patient.       Medications reviewed and updated on home medication list.    Influenza vaccine:     [] given    [] declined   [] received previously   [] plans to receive at a later time   [] refused    [] documented in EPIC

## 2019-05-29 ENCOUNTER — APPOINTMENT (OUTPATIENT)
Dept: PHARMACY | Age: 69
End: 2019-05-29
Payer: MEDICARE

## 2019-06-03 ENCOUNTER — HOSPITAL ENCOUNTER (OUTPATIENT)
Dept: PHARMACY | Age: 69
Setting detail: THERAPIES SERIES
Discharge: HOME | End: 2019-06-03
Payer: MEDICARE

## 2019-06-03 DIAGNOSIS — I82.409 RECURRENT ACUTE DEEP VEIN THROMBOSIS (DVT) OF LOWER EXTREMITY, UNSPECIFIED LATERALITY (HCC): ICD-10-CM

## 2019-06-03 LAB — POC INR: 2.3 (ref 0.8–1.2)

## 2019-06-03 PROCEDURE — 36416 COLLJ CAPILLARY BLOOD SPEC: CPT

## 2019-06-03 PROCEDURE — 85610 PROTHROMBIN TIME: CPT

## 2019-06-03 PROCEDURE — 99211 OFF/OP EST MAY X REQ PHY/QHP: CPT

## 2019-06-03 RX ORDER — CLINDAMYCIN HYDROCHLORIDE 150 MG/1
150 CAPSULE ORAL
COMMUNITY

## 2019-06-03 NOTE — PROGRESS NOTES
Medication Management [facility]  Anticoagulation Clinic  [phone] (phone)  [phone] (fax)      Ms. Missy Baxter is a 70 y.o.  female with history of DVT, per Dr. Kenn Mccabe referral, who presents today for Warfarin monitoring and adjustment (1 week late for 4 week visit; late for today's visit). Patient verifies tablet strength. Followed printed instructions for dose. No missed or extra doses. Patient denies bleeding. Has usual slight SOB/easy bruising. Has had some chest pain with stress. Had some swelling of right foot/leg for 2 days - gone now. No blood in urine or stool. No dietary changes. No changes in medication/OTC agents/herbals. No change in alcohol use or tobacco use. Change in activity level: slightly increased. Patient denies headaches/dizziness/lightheadedness/falls. Has usual balance issues. No vomiting/diarrhea or acute illness. No procedures scheduled in the future at this time. Will be taking Clindamycin for upcoming dental visit. Assessment:   Lab Results   Component Value Date    INR 2.30 (H) 06/03/2019    INR 2.20 (H) 05/01/2019    INR 1.80 (H) 04/10/2019     INR therapeutic - goal 2-3. Recent Labs     06/03/19  1140   INR 2.30*     Plan:  POCT INR ordered/performed/result reviewed. Continue PO Coumadin 6 mg W, 8 mg MTThFSS. Recheck INR in 4 weeks. Patient reminded to call the Anticoagulation Clinic with any signs or symptoms of bleeding or with any medication changes. Patient given instructions utilizing the teach back method. Again given routine H&H order. Discharged ambulatory in no apparent distress, with cane. After visit summary printed and reviewed with patient.       Medications reviewed and updated on home medication list.    Influenza vaccine:     [] given    [] declined   [] received previously   [] plans to receive at a later time   [] refused    [] documented in EPIC

## 2019-07-26 ENCOUNTER — HOSPITAL ENCOUNTER (OUTPATIENT)
Dept: PHARMACY | Age: 69
Setting detail: THERAPIES SERIES
Discharge: HOME | End: 2019-07-26
Payer: MEDICARE

## 2019-07-26 DIAGNOSIS — I82.409 RECURRENT ACUTE DEEP VEIN THROMBOSIS (DVT) OF LOWER EXTREMITY, UNSPECIFIED LATERALITY (HCC): ICD-10-CM

## 2019-07-26 LAB — POC INR: 1.9 (ref 0.8–1.2)

## 2019-07-26 PROCEDURE — 85610 PROTHROMBIN TIME: CPT | Performed by: PHARMACIST

## 2019-07-26 PROCEDURE — 99211 OFF/OP EST MAY X REQ PHY/QHP: CPT | Performed by: PHARMACIST

## 2019-07-26 PROCEDURE — 36416 COLLJ CAPILLARY BLOOD SPEC: CPT | Performed by: PHARMACIST

## 2019-08-22 ENCOUNTER — HOSPITAL ENCOUNTER (OUTPATIENT)
Dept: PHARMACY | Age: 69
Setting detail: THERAPIES SERIES
Discharge: HOME | End: 2019-08-22
Payer: MEDICARE

## 2019-08-22 DIAGNOSIS — I82.409 RECURRENT ACUTE DEEP VEIN THROMBOSIS (DVT) OF LOWER EXTREMITY, UNSPECIFIED LATERALITY (HCC): ICD-10-CM

## 2019-08-22 LAB — POC INR: 2.3 (ref 0.8–1.2)

## 2019-08-22 PROCEDURE — 85610 PROTHROMBIN TIME: CPT

## 2019-08-22 PROCEDURE — 99211 OFF/OP EST MAY X REQ PHY/QHP: CPT

## 2019-08-22 PROCEDURE — 36416 COLLJ CAPILLARY BLOOD SPEC: CPT

## 2019-09-23 ENCOUNTER — HOSPITAL ENCOUNTER (OUTPATIENT)
Dept: PHARMACY | Age: 69
Setting detail: THERAPIES SERIES
Discharge: HOME | End: 2019-09-23
Payer: MEDICARE

## 2019-09-23 ENCOUNTER — HOSPITAL ENCOUNTER (OUTPATIENT)
Age: 69
Discharge: HOME | End: 2019-09-23
Payer: MEDICARE

## 2019-09-23 DIAGNOSIS — I82.409 RECURRENT ACUTE DEEP VEIN THROMBOSIS (DVT) OF LOWER EXTREMITY, UNSPECIFIED LATERALITY (HCC): ICD-10-CM

## 2019-09-23 DIAGNOSIS — I82.402 RECURRENT ACUTE DEEP VEIN THROMBOSIS (DVT) OF LEFT LOWER EXTREMITY (HCC): ICD-10-CM

## 2019-09-23 LAB
HCT VFR BLD CALC: 36.4 % (ref 37–47)
HEMOGLOBIN: 12 GM/DL (ref 12–16)
POC INR: 2.8 (ref 0.8–1.2)

## 2019-09-23 PROCEDURE — 36415 COLL VENOUS BLD VENIPUNCTURE: CPT

## 2019-09-23 PROCEDURE — 36416 COLLJ CAPILLARY BLOOD SPEC: CPT

## 2019-09-23 PROCEDURE — 85018 HEMOGLOBIN: CPT

## 2019-09-23 PROCEDURE — 85610 PROTHROMBIN TIME: CPT

## 2019-09-23 PROCEDURE — 85014 HEMATOCRIT: CPT

## 2019-09-23 PROCEDURE — 99211 OFF/OP EST MAY X REQ PHY/QHP: CPT

## 2019-09-23 NOTE — PROGRESS NOTES
Medication Management [facility]  Anticoagulation Clinic  [phone] (phone)  [phone] (fax)      Ms. Cody Mccabe is a 70 y.o.  female with history of DVT who presents today for anticoagulation monitoring and adjustment. Patient verifies current dosing regimen and tablet strength. No missed or extra doses. Patient denies s/s bleeding/bruising/swelling/SOB/chest pain. No blood in urine or stool. No dietary changes. No changes in medication/OTC agents/Herbals. No change in alcohol use or tobacco use. No change in activity level. Patient denies dizziness/lightheadedness/falls. Had a couple of migraines since last appt. Sees Dr. Katja Dasilva. No vomiting or acute illness. Has diarrhea off and on. Takes pantoprazole. No procedures scheduled in the future at this time. Fell 5 times since last appt. Has not hit her head. Plans to discuss with . Assessment:   Lab Results   Component Value Date    INR 2.80 (H) 09/23/2019    INR 2.30 (H) 08/22/2019    INR 1.90 (H) 07/26/2019     INR therapeutic   Recent Labs     09/23/19  1440   INR 2.80*     Plan: POCT INR ordered and result reviewed. Continue Coumadin 6 mg po W, 8 mg po MTTHFSS. Recheck INR in 4 weeks. Patient reminded to call the Anticoagulation Clinic with any signs or symptoms of bleeding or with any medication changes. Patient given instructions utilizing the teach back method. Discharged ambulatory in no apparent distress with cane. After visit summary printed and reviewed with patient.       Medications reviewed and updated on home medication list Yes    Influenza vaccine:     [] given    [] declined   [] received previously   [] plans to receive at a later time   [] refused    [] documented in EPIC

## 2019-10-21 ENCOUNTER — APPOINTMENT (OUTPATIENT)
Dept: PHARMACY | Age: 69
End: 2019-10-21
Payer: MEDICARE

## 2019-10-29 ENCOUNTER — HOSPITAL ENCOUNTER (OUTPATIENT)
Dept: PHARMACY | Age: 69
Setting detail: THERAPIES SERIES
Discharge: HOME | End: 2019-10-29
Payer: MEDICARE

## 2019-10-29 VITALS — TEMPERATURE: 97.6 F

## 2019-10-29 DIAGNOSIS — I82.402 RECURRENT ACUTE DEEP VEIN THROMBOSIS (DVT) OF LEFT LOWER EXTREMITY (HCC): ICD-10-CM

## 2019-10-29 LAB — POC INR: 2.2 (ref 0.8–1.2)

## 2019-10-29 PROCEDURE — 6360000002 HC RX W HCPCS

## 2019-10-29 PROCEDURE — 36416 COLLJ CAPILLARY BLOOD SPEC: CPT

## 2019-10-29 PROCEDURE — G0008 ADMIN INFLUENZA VIRUS VAC: HCPCS

## 2019-10-29 PROCEDURE — 99211 OFF/OP EST MAY X REQ PHY/QHP: CPT

## 2019-10-29 PROCEDURE — 90686 IIV4 VACC NO PRSV 0.5 ML IM: CPT

## 2019-10-29 PROCEDURE — 85610 PROTHROMBIN TIME: CPT

## 2019-10-29 RX ADMIN — INFLUENZA A VIRUS A/BRISBANE/02/2018 IVR-190 (H1N1) ANTIGEN (PROPIOLACTONE INACTIVATED), INFLUENZA A VIRUS A/KANSAS/14/2017 X-327 (H3N2) ANTIGEN (PROPIOLACTONE INACTIVATED), INFLUENZA B VIRUS B/MARYLAND/15/2016 ANTIGEN (PROPIOLACTONE INACTIVATED), INFLUENZA B VIRUS B/PHUKET/3073/2013 BVR-1B ANTIGEN (PROPIOLACTONE INACTIVATED) 0.5 ML: 15; 15; 15; 15 INJECTION, SUSPENSION INTRAMUSCULAR at 11:42

## 2019-11-26 ENCOUNTER — HOSPITAL ENCOUNTER (OUTPATIENT)
Dept: PHARMACY | Age: 69
Setting detail: THERAPIES SERIES
Discharge: HOME | End: 2019-11-26
Payer: COMMERCIAL

## 2019-11-26 DIAGNOSIS — I82.402 RECURRENT ACUTE DEEP VEIN THROMBOSIS (DVT) OF LEFT LOWER EXTREMITY (HCC): ICD-10-CM

## 2019-11-26 LAB — POC INR: 2 (ref 0.8–1.2)

## 2019-11-26 PROCEDURE — 85610 PROTHROMBIN TIME: CPT

## 2019-11-26 PROCEDURE — 99211 OFF/OP EST MAY X REQ PHY/QHP: CPT

## 2019-11-26 PROCEDURE — 36416 COLLJ CAPILLARY BLOOD SPEC: CPT

## 2019-11-26 RX ORDER — SALIVA SUBSTITUTE COMB NO.10
POWDER IN PACKET (EA) MUCOUS MEMBRANE
Refills: 0 | COMMUNITY
Start: 2019-11-08

## 2020-01-08 ENCOUNTER — APPOINTMENT (OUTPATIENT)
Dept: PHARMACY | Age: 70
End: 2020-01-08
Payer: COMMERCIAL

## 2020-01-09 ENCOUNTER — HOSPITAL ENCOUNTER (OUTPATIENT)
Dept: PHARMACY | Age: 70
Setting detail: THERAPIES SERIES
Discharge: HOME | End: 2020-01-09
Payer: COMMERCIAL

## 2020-01-09 LAB — POC INR: 2 (ref 0.8–1.2)

## 2020-01-09 PROCEDURE — 99211 OFF/OP EST MAY X REQ PHY/QHP: CPT

## 2020-01-09 PROCEDURE — 36416 COLLJ CAPILLARY BLOOD SPEC: CPT

## 2020-01-09 PROCEDURE — 85610 PROTHROMBIN TIME: CPT

## 2020-01-09 NOTE — PROGRESS NOTES
Medication Management [facility]  Anticoagulation Clinic  [phone] (phone)  [phone] (fax)      Ms. Bro Friday is a 78 y.o.  female with history of DVT, per Dr. Tiffanie Ag referral,  who presents today for Warfarin monitoring and adjustment (2 weeks late for 4 week visit). Patient verifies current dosing regimen and tablet strength. No missed doses. Took 8 mg Coumadin W, 1/1 and 1/8 (should've been 6 mg); didn't say why. Patient denies bleeding/SOB. Has usual intermittent chest pressure. Has usual easy bruising. Has usual swelling of legs, depending how much she's on them. No blood in urine or stool. No dietary changes. No changes in medication/OTC agents/herbals. No change in alcohol use or tobacco use. Change in activity level: decreased greatly since 12/31 fall. Takes nothing for usual headaches. Has had 4 falls since last visit (2 of them this year) - usually gets dizzy beforehand. Has not hit head. She called neurologist (Dr. Tolu Hamm) 1/2; states was told to go to ER if problem persists or has vomiting. Friend with patient states she and patient's sister are trying to get her to go to [facility] ER today. No vomiting/diarrhea or acute illness. No procedures scheduled in the future at this time. States on 12/31 whole left side went numb - took three full strength ASA. Assessment:   Lab Results   Component Value Date    INR 2.00 (H) 01/09/2020    INR 2.00 (H) 11/26/2019    INR 2.20 (H) 10/29/2019     INR therapeutic - goal 2-3. Recent Labs     01/09/20  1057   INR 2.00*       Plan:  POCT INR ordered/performed/result reviewed. Continue ordered dose of PO Coumadin 6 mg W, 8 mg MTThFSS. Recheck INR in 2 weeks. (Report given - orders entered by Batsheva Harrison, PharmD.)   Patient reminded to call the Anticoagulation Clinic with any signs or symptoms of bleeding or with any medication changes.   Patient given instructions utilizing the teach back method. Discharged ambulatory in no apparent distress, with female friend. After visit summary printed and reviewed with patient.       Medications reviewed and updated on home medication list.    Influenza vaccine:     [] given    [x] declined   [x] received previously   [] plans to receive at a later time   [] refused    [x] documented in EPIC

## 2020-01-23 ENCOUNTER — APPOINTMENT (OUTPATIENT)
Dept: PHARMACY | Age: 70
End: 2020-01-23
Payer: COMMERCIAL

## 2020-01-27 ENCOUNTER — HOSPITAL ENCOUNTER (OUTPATIENT)
Dept: PHARMACY | Age: 70
Setting detail: THERAPIES SERIES
Discharge: HOME | End: 2020-01-27
Payer: COMMERCIAL

## 2020-01-27 LAB — POC INR: 1.3 (ref 0.8–1.2)

## 2020-01-27 PROCEDURE — 85610 PROTHROMBIN TIME: CPT

## 2020-01-27 PROCEDURE — 36416 COLLJ CAPILLARY BLOOD SPEC: CPT

## 2020-01-27 PROCEDURE — 99211 OFF/OP EST MAY X REQ PHY/QHP: CPT

## 2020-01-27 NOTE — PROGRESS NOTES
Medication Management [facility]  Anticoagulation Clinic  [phone] (phone)  [phone] (fax)      Ms. Carey Solomon is a 78 y.o.  female with history of DVT who presents today for anticoagulation monitoring and adjustment. Patient verifies current dosing regimen and tablet strength. No missed or extra doses. Patient denies s/s bleeding/bruising/swelling/SOB/chest pain  No blood in urine or stool. No dietary changes. No changes in OTC agents/Herbals. Patient finished a 7 day course of Keflex 500mg BID. No change in alcohol use or tobacco use. Patient is tired frequently which decreases her activity. Patient denies dizziness/lightheadedness/falls. Patient has occasional headaches for which she takes Fioricet. No vomiting/diarrhea or acute illness. No Procedures scheduled in the future at this time. Patient having neurologist follow-up from possible December stroke. Assessment:   Lab Results   Component Value Date    INR 1.30 (H) 01/27/2020    INR 2.00 (H) 01/09/2020    INR 2.00 (H) 11/26/2019     INR subtherapeutic   Recent Labs     01/27/20  1040   INR 1.30*     Patient presents with subtherapeutic INR today. Previously, patient had five consecutive therapeutic INRs on current regimen. Plan:  Coumadin 12 mg x 2 doses (1/27/20-1/28/20) then continue Coumadin 6 mg W and 8 mg MTuThFSaSu. Recheck INR in 10 days. Patient reminded to call the Anticoagulation Clinic with any signs or symptoms of bleeding or with any medication changes. Patient given instructions utilizing the teach back method. Discharged ambulatory in no apparent distress. Patient interview completed and discussed with pharmacist by Rosa Maria Denis, PharmD Candidate    After visit summary printed and reviewed with patient.       Medications reviewed and updated on home medication list Yes    Influenza vaccine:     [] given    [x] declined   [x] received previously   [] plans to receive at a later time   [] refused    [x] documented in [facility], PharmD, BCPS  1/27/2020  11:34 AM

## 2020-03-09 ENCOUNTER — APPOINTMENT (OUTPATIENT)
Dept: PHARMACY | Age: 70
End: 2020-03-09
Payer: COMMERCIAL

## 2020-03-10 ENCOUNTER — HOSPITAL ENCOUNTER (OUTPATIENT)
Dept: PHARMACY | Age: 70
Setting detail: THERAPIES SERIES
Discharge: HOME | End: 2020-03-10
Payer: COMMERCIAL

## 2020-03-10 LAB — POC INR: 1.8 (ref 0.8–1.2)

## 2020-03-10 PROCEDURE — 99211 OFF/OP EST MAY X REQ PHY/QHP: CPT

## 2020-03-10 PROCEDURE — 85610 PROTHROMBIN TIME: CPT

## 2020-03-10 PROCEDURE — 36416 COLLJ CAPILLARY BLOOD SPEC: CPT

## 2020-04-13 ENCOUNTER — TELEPHONE (OUTPATIENT)
Dept: PHARMACY | Age: 70
End: 2020-04-13

## 2020-04-22 RX ORDER — WARFARIN SODIUM 4 MG/1
TABLET ORAL
Qty: 60 TABLET | Refills: 10 | Status: SHIPPED | OUTPATIENT
Start: 2020-04-22 | End: 2020-07-29

## 2020-05-21 ENCOUNTER — TELEPHONE (OUTPATIENT)
Dept: PHARMACY | Age: 70
End: 2020-05-21

## 2020-06-10 ENCOUNTER — TELEPHONE (OUTPATIENT)
Dept: PHARMACY | Age: 70
End: 2020-06-10

## 2020-06-10 NOTE — TELEPHONE ENCOUNTER
Medication Management [address]   [phone] (phone)  [phone] (fax)      Pharmacy student working under the supervision of a licensed pharmacist during the COVID-19 pandemic assessed the following: The following statement was review with patient regarding their virtual visit:  We want to confirm that, for purposes of billing, this is a virtual visit with your provider for which we will submit a claim for reimbursement with your insurance company. You may be responsible for any copays, coinsurance amounts or other amounts not covered by your insurance company. If you do not accept this, unfortunately we will not be able to schedule a virtual visit with the provider. Do you accept? Yes    Verbal Consent for Consult Agreement participation during COVID-19 Pandemic  Verbiage for CPA Consent:  Discussed with patient the Pharmacist Collaborative Practice Agreement. Patient provided verbal and/or electronic (exFlora Skipper) consent to be managed by a pharmacist per collaborative practice agreement between the pharmacist and referring physician. This is in lieu of paper consent due to COVID-19 precautions and the use of remote/virtual visits. Yes    Current Outpatient Medications   Medication Sig Dispense Refill    warfarin (COUMADIN) 4 MG tablet TAKE BY MOUTH FOR DVT AS DIRECTED BY [facility] (60 TABLETS = 30 DAY SUPPLY) 60 tablet 10    Artificial Saliva (NEUTRASAL) PACK For dry mouth  0    clindamycin (CLEOCIN) 150 MG capsule Take 150 mg by mouth Usually 4 before dental appts.       Nutritional Supplements (BOOST HIGH PROTEIN) LIQD Take 1 Can by mouth Indications: Nutritional Support 3-4 days/week       oxybutynin (DITROPAN) 5 MG tablet Take 5 mg by mouth 3 times daily Indications: Dysfunction of the Urinary Bladder       topiramate (TOPAMAX) 25 MG tablet Take 50 mg by mouth 2 times daily Indications: Migraine Headache       butalbital-acetaminophen-caffeine (FIORICET, ESGIC) -40 MG per tablet Take 1 tablet by mouth every 4 hours as needed for Headaches      Acetaminophen (TYLENOL PO) Take 650 mg by mouth See Admin Instructions Indications: Pain Don't take more than 3,000 mg per day, including what's in Fioricet      Multiple Vitamins-Minerals (THERAPEUTIC MULTIVITAMIN-MINERALS) tablet Take 2 tablets by mouth daily Indications: Treatment to Prevent Vitamin Deficiency       Apoaequorin (PREVAGEN PO) Take 1 tablet by mouth daily       atorvastatin (LIPITOR) 40 MG tablet Take 40 mg by mouth daily. Indications: Increase in the Amount of Cholesterol in the Blood      fluoxetine (PROZAC) 40 MG capsule Take 40 mg by mouth daily. Indications: Depression      Loratadine-Pseudoephedrine (CLARITIN-D 12 HOUR PO) Take 1 tablet by mouth daily as needed To decrease drainage.  pantoprazole (PROTONIX) 40 MG tablet Take 40 mg by mouth daily. Indications: Nonerosive GERD       No current facility-administered medications for this visit. In the last month have you been in contact with someone who has been confirmed or suspected to have Coronavirus/COVID-19? No    If yes, does the patient have any of the following symptoms: N/A       If patient has symptoms, please direct to appropriate flu clinic. Have you traveled internationally in the past month? No  If yes, where? N/A    Allan Purdy, PharmD Candidate.

## 2020-07-29 ENCOUNTER — TELEPHONE (OUTPATIENT)
Dept: PHARMACY | Age: 70
End: 2020-07-29

## 2020-07-29 NOTE — TELEPHONE ENCOUNTER
Patient discharged from Coumadin Clinic due to no shows.     Fermín Chacko, PharmD, BCPS  7/29/2020  9:32 AM

## 2024-03-08 ENCOUNTER — APPOINTMENT (OUTPATIENT)
Dept: CT IMAGING | Age: 74
End: 2024-03-08
Payer: MEDICARE

## 2024-03-08 ENCOUNTER — HOSPITAL ENCOUNTER (EMERGENCY)
Age: 74
Discharge: ANOTHER HOSPITAL | End: 2024-03-09
Attending: EMERGENCY MEDICINE
Payer: MEDICARE

## 2024-03-08 DIAGNOSIS — R06.00 DYSPNEA, UNSPECIFIED TYPE: ICD-10-CM

## 2024-03-08 DIAGNOSIS — N17.9 AKI (ACUTE KIDNEY INJURY) (HCC): ICD-10-CM

## 2024-03-08 DIAGNOSIS — R55 SYNCOPE, UNSPECIFIED SYNCOPE TYPE: Primary | ICD-10-CM

## 2024-03-08 DIAGNOSIS — R07.9 CHEST PAIN, UNSPECIFIED TYPE: ICD-10-CM

## 2024-03-08 LAB
ALBUMIN SERPL-MCNC: 3.1 GM/DL (ref 3.4–5)
ALP BLD-CCNC: 91 IU/L (ref 40–129)
ALT SERPL-CCNC: 9 U/L (ref 10–40)
ANION GAP SERPL CALCULATED.3IONS-SCNC: 17 MMOL/L (ref 7–16)
AST SERPL-CCNC: 15 IU/L (ref 15–37)
BASOPHILS ABSOLUTE: 0.1 K/CU MM
BASOPHILS RELATIVE PERCENT: 0.7 % (ref 0–1)
BILIRUB SERPL-MCNC: 0.5 MG/DL (ref 0–1)
BUN SERPL-MCNC: 20 MG/DL (ref 6–23)
CALCIUM SERPL-MCNC: 8.6 MG/DL (ref 8.3–10.6)
CHLORIDE BLD-SCNC: 102 MMOL/L (ref 99–110)
CO2: 18 MMOL/L (ref 21–32)
CREAT SERPL-MCNC: 1.5 MG/DL (ref 0.6–1.1)
DIFFERENTIAL TYPE: ABNORMAL
EKG ATRIAL RATE: 76 BPM
EKG DIAGNOSIS: NORMAL
EKG P AXIS: 69 DEGREES
EKG P-R INTERVAL: 158 MS
EKG Q-T INTERVAL: 448 MS
EKG QRS DURATION: 138 MS
EKG QTC CALCULATION (BAZETT): 504 MS
EKG R AXIS: 47 DEGREES
EKG T AXIS: 23 DEGREES
EKG VENTRICULAR RATE: 76 BPM
EOSINOPHILS ABSOLUTE: 0.2 K/CU MM
EOSINOPHILS RELATIVE PERCENT: 2.4 % (ref 0–3)
GFR SERPL CREATININE-BSD FRML MDRD: 36 ML/MIN/1.73M2
GLUCOSE SERPL-MCNC: 88 MG/DL (ref 70–99)
HCT VFR BLD CALC: 34.7 % (ref 37–47)
HEMOGLOBIN: 11.8 GM/DL (ref 12.5–16)
IMMATURE NEUTROPHIL %: 0.4 % (ref 0–0.43)
LYMPHOCYTES ABSOLUTE: 1.5 K/CU MM
LYMPHOCYTES RELATIVE PERCENT: 22.1 % (ref 24–44)
MCH RBC QN AUTO: 30.9 PG (ref 27–31)
MCHC RBC AUTO-ENTMCNC: 34 % (ref 32–36)
MCV RBC AUTO: 90.8 FL (ref 78–100)
MONOCYTES ABSOLUTE: 0.8 K/CU MM
MONOCYTES RELATIVE PERCENT: 10.8 % (ref 0–4)
PDW BLD-RTO: 13.6 % (ref 11.7–14.9)
PLATELET # BLD: 253 K/CU MM (ref 140–440)
PMV BLD AUTO: 10.8 FL (ref 7.5–11.1)
POTASSIUM SERPL-SCNC: 3.6 MMOL/L (ref 3.5–5.1)
RBC # BLD: 3.82 M/CU MM (ref 4.2–5.4)
SEGMENTED NEUTROPHILS ABSOLUTE COUNT: 4.4 K/CU MM
SEGMENTED NEUTROPHILS RELATIVE PERCENT: 63.6 % (ref 36–66)
SODIUM BLD-SCNC: 137 MMOL/L (ref 135–145)
TOTAL IMMATURE NEUTOROPHIL: 0.03 K/CU MM
TOTAL PROTEIN: 7.2 GM/DL (ref 6.4–8.2)
TROPONIN, HIGH SENSITIVITY: 8 NG/L (ref 0–14)
TROPONIN, HIGH SENSITIVITY: 8 NG/L (ref 0–14)
WBC # BLD: 7 K/CU MM (ref 4–10.5)

## 2024-03-08 PROCEDURE — 96360 HYDRATION IV INFUSION INIT: CPT

## 2024-03-08 PROCEDURE — 6360000004 HC RX CONTRAST MEDICATION: Performed by: EMERGENCY MEDICINE

## 2024-03-08 PROCEDURE — 80053 COMPREHEN METABOLIC PANEL: CPT

## 2024-03-08 PROCEDURE — 85025 COMPLETE CBC W/AUTO DIFF WBC: CPT

## 2024-03-08 PROCEDURE — 84484 ASSAY OF TROPONIN QUANT: CPT

## 2024-03-08 PROCEDURE — 99285 EMERGENCY DEPT VISIT HI MDM: CPT

## 2024-03-08 PROCEDURE — 71275 CT ANGIOGRAPHY CHEST: CPT

## 2024-03-08 PROCEDURE — 2580000003 HC RX 258: Performed by: EMERGENCY MEDICINE

## 2024-03-08 PROCEDURE — 72125 CT NECK SPINE W/O DYE: CPT

## 2024-03-08 PROCEDURE — 70450 CT HEAD/BRAIN W/O DYE: CPT

## 2024-03-08 RX ORDER — 0.9 % SODIUM CHLORIDE 0.9 %
500 INTRAVENOUS SOLUTION INTRAVENOUS ONCE
Status: COMPLETED | OUTPATIENT
Start: 2024-03-08 | End: 2024-03-08

## 2024-03-08 RX ORDER — CIPROFLOXACIN HYDROCHLORIDE 3.5 MG/ML
1 SOLUTION/ DROPS TOPICAL ONCE
Status: DISCONTINUED | OUTPATIENT
Start: 2024-03-08 | End: 2024-03-08

## 2024-03-08 RX ADMIN — SODIUM CHLORIDE 500 ML: 9 INJECTION, SOLUTION INTRAVENOUS at 21:25

## 2024-03-08 RX ADMIN — IOPAMIDOL 75 ML: 755 INJECTION, SOLUTION INTRAVENOUS at 20:49

## 2024-03-08 ASSESSMENT — PAIN DESCRIPTION - PAIN TYPE: TYPE: ACUTE PAIN

## 2024-03-08 ASSESSMENT — PAIN - FUNCTIONAL ASSESSMENT
PAIN_FUNCTIONAL_ASSESSMENT: 0-10
PAIN_FUNCTIONAL_ASSESSMENT: PREVENTS OR INTERFERES SOME ACTIVE ACTIVITIES AND ADLS

## 2024-03-08 ASSESSMENT — PAIN DESCRIPTION - LOCATION: LOCATION: HEAD

## 2024-03-08 ASSESSMENT — LIFESTYLE VARIABLES
HOW OFTEN DO YOU HAVE A DRINK CONTAINING ALCOHOL: NEVER
HOW MANY STANDARD DRINKS CONTAINING ALCOHOL DO YOU HAVE ON A TYPICAL DAY: PATIENT DOES NOT DRINK

## 2024-03-08 ASSESSMENT — PAIN DESCRIPTION - DESCRIPTORS: DESCRIPTORS: SHARP

## 2024-03-08 ASSESSMENT — PAIN DESCRIPTION - ONSET: ONSET: SUDDEN

## 2024-03-08 ASSESSMENT — PAIN SCALES - GENERAL: PAINLEVEL_OUTOF10: 4

## 2024-03-08 ASSESSMENT — PAIN DESCRIPTION - FREQUENCY: FREQUENCY: CONTINUOUS

## 2024-03-09 ENCOUNTER — HOSPITAL ENCOUNTER (INPATIENT)
Age: 74
LOS: 6 days | Discharge: HOME | End: 2024-03-15
Attending: STUDENT IN AN ORGANIZED HEALTH CARE EDUCATION/TRAINING PROGRAM | Admitting: STUDENT IN AN ORGANIZED HEALTH CARE EDUCATION/TRAINING PROGRAM
Payer: MEDICARE

## 2024-03-09 VITALS
RESPIRATION RATE: 21 BRPM | HEART RATE: 79 BPM | HEIGHT: 64 IN | TEMPERATURE: 97.6 F | DIASTOLIC BLOOD PRESSURE: 57 MMHG | BODY MASS INDEX: 25.61 KG/M2 | SYSTOLIC BLOOD PRESSURE: 97 MMHG | WEIGHT: 150 LBS | OXYGEN SATURATION: 97 %

## 2024-03-09 PROBLEM — N17.9 AKI (ACUTE KIDNEY INJURY) (HCC): Status: ACTIVE | Noted: 2024-03-09

## 2024-03-09 LAB
25(OH)D3 SERPL-MCNC: 18.43 NG/ML
ALBUMIN SERPL-MCNC: 3 GM/DL (ref 3.4–5)
ALP BLD-CCNC: 82 IU/L (ref 40–129)
ALT SERPL-CCNC: 7 U/L (ref 10–40)
ANION GAP SERPL CALCULATED.3IONS-SCNC: 11 MMOL/L (ref 7–16)
AST SERPL-CCNC: 12 IU/L (ref 15–37)
BASOPHILS ABSOLUTE: 0 K/CU MM
BASOPHILS RELATIVE PERCENT: 0.6 % (ref 0–1)
BILIRUB SERPL-MCNC: 0.5 MG/DL (ref 0–1)
BUN SERPL-MCNC: 19 MG/DL (ref 6–23)
CALCIUM SERPL-MCNC: 8.2 MG/DL (ref 8.3–10.6)
CHLORIDE BLD-SCNC: 107 MMOL/L (ref 99–110)
CO2: 20 MMOL/L (ref 21–32)
CREAT SERPL-MCNC: 1.5 MG/DL (ref 0.6–1.1)
DIFFERENTIAL TYPE: ABNORMAL
EOSINOPHILS ABSOLUTE: 0.2 K/CU MM
EOSINOPHILS RELATIVE PERCENT: 2.8 % (ref 0–3)
FOLATE SERPL-MCNC: 2 NG/ML (ref 3.1–17.5)
GFR SERPL CREATININE-BSD FRML MDRD: 36 ML/MIN/1.73M2
GLUCOSE SERPL-MCNC: 91 MG/DL (ref 70–99)
HCT VFR BLD CALC: 32.7 % (ref 37–47)
HEMOGLOBIN: 10.5 GM/DL (ref 12.5–16)
IMMATURE NEUTROPHIL %: 0.1 % (ref 0–0.43)
INR BLD: 1.5 INDEX
LYMPHOCYTES ABSOLUTE: 1.4 K/CU MM
LYMPHOCYTES RELATIVE PERCENT: 20.1 % (ref 24–44)
MCH RBC QN AUTO: 30.9 PG (ref 27–31)
MCHC RBC AUTO-ENTMCNC: 32.1 % (ref 32–36)
MCV RBC AUTO: 96.2 FL (ref 78–100)
MONOCYTES ABSOLUTE: 0.8 K/CU MM
MONOCYTES RELATIVE PERCENT: 10.6 % (ref 0–4)
NUCLEATED RBC %: 0 %
PDW BLD-RTO: 13.6 % (ref 11.7–14.9)
PLATELET # BLD: 209 K/CU MM (ref 140–440)
PMV BLD AUTO: 11.1 FL (ref 7.5–11.1)
POTASSIUM SERPL-SCNC: 3.7 MMOL/L (ref 3.5–5.1)
PROTHROMBIN TIME: 18.5 SECONDS (ref 11.7–14.5)
RBC # BLD: 3.4 M/CU MM (ref 4.2–5.4)
SEGMENTED NEUTROPHILS ABSOLUTE COUNT: 4.6 K/CU MM
SEGMENTED NEUTROPHILS RELATIVE PERCENT: 65.8 % (ref 36–66)
SODIUM BLD-SCNC: 138 MMOL/L (ref 135–145)
TOTAL IMMATURE NEUTOROPHIL: 0.01 K/CU MM
TOTAL NUCLEATED RBC: 0 K/CU MM
TOTAL PROTEIN: 5.8 GM/DL (ref 6.4–8.2)
TSH SERPL DL<=0.005 MIU/L-ACNC: 1.63 UIU/ML (ref 0.27–4.2)
VITAMIN B-12: 304.3 PG/ML (ref 211–911)
WBC # BLD: 7.1 K/CU MM (ref 4–10.5)

## 2024-03-09 PROCEDURE — 80053 COMPREHEN METABOLIC PANEL: CPT

## 2024-03-09 PROCEDURE — 94761 N-INVAS EAR/PLS OXIMETRY MLT: CPT

## 2024-03-09 PROCEDURE — 97162 PT EVAL MOD COMPLEX 30 MIN: CPT

## 2024-03-09 PROCEDURE — 85025 COMPLETE CBC W/AUTO DIFF WBC: CPT

## 2024-03-09 PROCEDURE — 97530 THERAPEUTIC ACTIVITIES: CPT

## 2024-03-09 PROCEDURE — 2580000003 HC RX 258: Performed by: STUDENT IN AN ORGANIZED HEALTH CARE EDUCATION/TRAINING PROGRAM

## 2024-03-09 PROCEDURE — 82607 VITAMIN B-12: CPT

## 2024-03-09 PROCEDURE — 6370000000 HC RX 637 (ALT 250 FOR IP): Performed by: STUDENT IN AN ORGANIZED HEALTH CARE EDUCATION/TRAINING PROGRAM

## 2024-03-09 PROCEDURE — 82306 VITAMIN D 25 HYDROXY: CPT

## 2024-03-09 PROCEDURE — 97166 OT EVAL MOD COMPLEX 45 MIN: CPT

## 2024-03-09 PROCEDURE — 97116 GAIT TRAINING THERAPY: CPT

## 2024-03-09 PROCEDURE — 85610 PROTHROMBIN TIME: CPT

## 2024-03-09 PROCEDURE — 1200000000 HC SEMI PRIVATE

## 2024-03-09 PROCEDURE — 97535 SELF CARE MNGMENT TRAINING: CPT

## 2024-03-09 PROCEDURE — 36415 COLL VENOUS BLD VENIPUNCTURE: CPT

## 2024-03-09 PROCEDURE — 82746 ASSAY OF FOLIC ACID SERUM: CPT

## 2024-03-09 PROCEDURE — 84443 ASSAY THYROID STIM HORMONE: CPT

## 2024-03-09 RX ORDER — ACETAMINOPHEN 650 MG/1
650 SUPPOSITORY RECTAL EVERY 6 HOURS PRN
Status: DISCONTINUED | OUTPATIENT
Start: 2024-03-09 | End: 2024-03-15 | Stop reason: HOSPADM

## 2024-03-09 RX ORDER — SODIUM CHLORIDE 9 MG/ML
INJECTION, SOLUTION INTRAVENOUS PRN
Status: DISCONTINUED | OUTPATIENT
Start: 2024-03-09 | End: 2024-03-15 | Stop reason: HOSPADM

## 2024-03-09 RX ORDER — POTASSIUM CHLORIDE 7.45 MG/ML
10 INJECTION INTRAVENOUS PRN
Status: DISCONTINUED | OUTPATIENT
Start: 2024-03-09 | End: 2024-03-15 | Stop reason: HOSPADM

## 2024-03-09 RX ORDER — MAGNESIUM SULFATE IN WATER 40 MG/ML
2000 INJECTION, SOLUTION INTRAVENOUS PRN
Status: DISCONTINUED | OUTPATIENT
Start: 2024-03-09 | End: 2024-03-15 | Stop reason: HOSPADM

## 2024-03-09 RX ORDER — ENOXAPARIN SODIUM 100 MG/ML
40 INJECTION SUBCUTANEOUS EVERY EVENING
Status: DISCONTINUED | OUTPATIENT
Start: 2024-03-10 | End: 2024-03-11

## 2024-03-09 RX ORDER — SODIUM CHLORIDE 0.9 % (FLUSH) 0.9 %
5-40 SYRINGE (ML) INJECTION PRN
Status: DISCONTINUED | OUTPATIENT
Start: 2024-03-09 | End: 2024-03-15 | Stop reason: HOSPADM

## 2024-03-09 RX ORDER — TOPIRAMATE 25 MG/1
50 TABLET ORAL 2 TIMES DAILY
Status: DISCONTINUED | OUTPATIENT
Start: 2024-03-09 | End: 2024-03-15 | Stop reason: HOSPADM

## 2024-03-09 RX ORDER — POLYETHYLENE GLYCOL 3350 17 G/17G
17 POWDER, FOR SOLUTION ORAL DAILY PRN
Status: DISCONTINUED | OUTPATIENT
Start: 2024-03-09 | End: 2024-03-15 | Stop reason: HOSPADM

## 2024-03-09 RX ORDER — ONDANSETRON 4 MG/1
4 TABLET, ORALLY DISINTEGRATING ORAL EVERY 8 HOURS PRN
Status: DISCONTINUED | OUTPATIENT
Start: 2024-03-09 | End: 2024-03-15 | Stop reason: HOSPADM

## 2024-03-09 RX ORDER — PANTOPRAZOLE SODIUM 40 MG/1
40 TABLET, DELAYED RELEASE ORAL DAILY
Status: DISCONTINUED | OUTPATIENT
Start: 2024-03-09 | End: 2024-03-15 | Stop reason: HOSPADM

## 2024-03-09 RX ORDER — SODIUM CHLORIDE, SODIUM LACTATE, POTASSIUM CHLORIDE, CALCIUM CHLORIDE 600; 310; 30; 20 MG/100ML; MG/100ML; MG/100ML; MG/100ML
INJECTION, SOLUTION INTRAVENOUS CONTINUOUS
Status: ACTIVE | OUTPATIENT
Start: 2024-03-09 | End: 2024-03-09

## 2024-03-09 RX ORDER — ACETAMINOPHEN 325 MG/1
650 TABLET ORAL EVERY 6 HOURS PRN
Status: DISCONTINUED | OUTPATIENT
Start: 2024-03-09 | End: 2024-03-15 | Stop reason: HOSPADM

## 2024-03-09 RX ORDER — ATORVASTATIN CALCIUM 40 MG/1
40 TABLET, FILM COATED ORAL DAILY
Status: DISCONTINUED | OUTPATIENT
Start: 2024-03-09 | End: 2024-03-15 | Stop reason: HOSPADM

## 2024-03-09 RX ORDER — ONDANSETRON 2 MG/ML
4 INJECTION INTRAMUSCULAR; INTRAVENOUS EVERY 6 HOURS PRN
Status: DISCONTINUED | OUTPATIENT
Start: 2024-03-09 | End: 2024-03-15 | Stop reason: HOSPADM

## 2024-03-09 RX ORDER — SODIUM CHLORIDE 0.9 % (FLUSH) 0.9 %
5-40 SYRINGE (ML) INJECTION EVERY 12 HOURS SCHEDULED
Status: DISCONTINUED | OUTPATIENT
Start: 2024-03-09 | End: 2024-03-15 | Stop reason: HOSPADM

## 2024-03-09 RX ORDER — LANOLIN ALCOHOL/MO/W.PET/CERES
3 CREAM (GRAM) TOPICAL NIGHTLY PRN
Status: DISCONTINUED | OUTPATIENT
Start: 2024-03-09 | End: 2024-03-15 | Stop reason: HOSPADM

## 2024-03-09 RX ORDER — FLUOXETINE HYDROCHLORIDE 20 MG/1
40 CAPSULE ORAL DAILY
Status: DISCONTINUED | OUTPATIENT
Start: 2024-03-09 | End: 2024-03-15 | Stop reason: HOSPADM

## 2024-03-09 RX ORDER — OXYBUTYNIN CHLORIDE 5 MG/1
5 TABLET ORAL 3 TIMES DAILY
Status: DISCONTINUED | OUTPATIENT
Start: 2024-03-09 | End: 2024-03-15 | Stop reason: HOSPADM

## 2024-03-09 RX ADMIN — OXYBUTYNIN CHLORIDE 5 MG: 5 TABLET ORAL at 08:31

## 2024-03-09 RX ADMIN — OXYBUTYNIN CHLORIDE 5 MG: 5 TABLET ORAL at 15:21

## 2024-03-09 RX ADMIN — SODIUM CHLORIDE, POTASSIUM CHLORIDE, SODIUM LACTATE AND CALCIUM CHLORIDE: 600; 310; 30; 20 INJECTION, SOLUTION INTRAVENOUS at 02:39

## 2024-03-09 RX ADMIN — FLUOXETINE HYDROCHLORIDE 40 MG: 20 CAPSULE ORAL at 08:31

## 2024-03-09 RX ADMIN — TOPIRAMATE 50 MG: 25 TABLET, FILM COATED ORAL at 08:31

## 2024-03-09 RX ADMIN — OXYBUTYNIN CHLORIDE 5 MG: 5 TABLET ORAL at 20:22

## 2024-03-09 RX ADMIN — PANTOPRAZOLE SODIUM 40 MG: 40 TABLET, DELAYED RELEASE ORAL at 06:41

## 2024-03-09 RX ADMIN — ATORVASTATIN CALCIUM 40 MG: 40 TABLET, FILM COATED ORAL at 08:31

## 2024-03-09 RX ADMIN — SODIUM CHLORIDE, PRESERVATIVE FREE 5 ML: 5 INJECTION INTRAVENOUS at 20:22

## 2024-03-09 RX ADMIN — TOPIRAMATE 50 MG: 25 TABLET, FILM COATED ORAL at 20:22

## 2024-03-09 NOTE — PLAN OF CARE
Problem: Discharge Planning  Goal: Discharge to home or other facility with appropriate resources  Outcome: Progressing  Flowsheets (Taken 3/9/2024 0223)  Discharge to home or other facility with appropriate resources:   Identify barriers to discharge with patient and caregiver   Identify discharge learning needs (meds, wound care, etc)   Refer to discharge planning if patient needs post-hospital services based on physician order or complex needs related to functional status, cognitive ability or social support system     Problem: Safety - Adult  Goal: Free from fall injury  Outcome: Progressing     Problem: Skin/Tissue Integrity  Goal: Absence of new skin breakdown  Description: 1.  Monitor for areas of redness and/or skin breakdown  2.  Assess vascular access sites hourly  3.  Every 4-6 hours minimum:  Change oxygen saturation probe site  4.  Every 4-6 hours:  If on nasal continuous positive airway pressure, respiratory therapy assess nares and determine need for appliance change or resting period.  Outcome: Progressing

## 2024-03-09 NOTE — PROGRESS NOTES
off regular lower body clothing? [] 1    [] 2   [] 2   [x] 3   [] 3   [] 4      2. Bathing (including washing, rinsing, drying)? [] 1   [] 2   [] 2 [x] 3 [] 3 [] 4   3. Toileting, which includes using toilet, bedpan, or urinal? [] 1    [] 2   [] 2   [x] 3   [] 3   [] 4     4. Putting on and taking off regular upper body clothing? [] 1   [] 2   [] 2   [x] 3   [] 3    [] 4      5. Taking care of personal grooming such as brushing teeth? [] 1   [] 2    [] 2 [] 3    [x] 3   [] 4      6. Eating meals?   [] 1   [] 2   [] 2   [] 3   [] 3   [x] 4        Raw Score:  19     [24=0% impaired(CH), 23=1-19%(CI), 20-22=20-39%(CJ), 15-19=40-59%(CK), 10-14=60-79%(CL), 7-9=80-99%(CM), 6=100%(CN)]     Treatment:    Self Care Training:   Cues were given for safety, sequence, UE/LE placement, visual cues, and balance.    Activities performed today included LB dressing tasks, toileting, hand hygiene at sink    Therapeutic Activity Training:   Therapeutic activity training was instructed today.  Cues were given for safety, sequence, UE/LE placement, awareness, and balance.    Activities performed today included bed mobility training, sup-sit, sit-stand, ambulation.      Educated pt on role of OT, therapy POC and functional goals, progression w/ ADLs and transfers, importance of movement and OOB activity, d/c recommendations     Safety Measures: Gait belt used, Left in recliner, Alarm in place  Recommendations for NURSING activity:  Up to chair for all 3 meals and up to bathroom for all toileting needs     Assessment:  Pt is a 73 y o F admitted d/t OLEG. Pt at baseline is IND for ADLs, IND for high level IADLs, and mod I for functional transfers/mobility. Pt currently presents w/ deficits in ADL and high level IADL independence, functional ADL transfers, strength, and functional activity tolerance. Continued OT services recommended to increase safety and independence with ADL routine and to address remaining functional deficits. Pt would  benefit from continued acute care OT services w/ discharge to SNF.    Complexity: Moderate  Prognosis: Good, no significant barriers to participation at this time.   Occupational Therapy Plan  Times Per Week: 3+       Goals:  Pt will complete all aspects of bed mobility for EOB/OOB ADLs w/ supervision.  Pt will complete UB ADLs w/ supervision.  Pt will complete LB ADLs w/ supervision.  Pt will complete all functional transfers to and from bed, chair, toilet, shower chair w/ supervision.  Pt will ambulate functional household distance w/ supervision.  Pt will complete all aspects of toileting task w/ supervision.  Pt will perform therex/theract in order to increase strength and functional activity tolerance necessary for increased independence w/ ADL routine.    Pt goal: go home, get stronger  Time Frame for STGs: discharge    Equipment: Continue to assess at next LOC    Time:   Time in: 0835  Time out: 0908  Total time: 33  Timed treatment minutes: 23        Electronically signed by:      DEEPALI Hoffmann/MEGA  [id number]

## 2024-03-09 NOTE — PROGRESS NOTES
Assessed pt in ED for consideration for telemetry observation unit regarding syncopal episode which lead to her fall at home. Pt noted to have parkinsonism/tardive tremors upon assessment. Pt admits to several forward falls over the past few years in which she has landed on her face. She states she has not been seen by any neurologist in years since her neurologist retired sometime ago. She states the tremors began since she has last seen her neurologist. This was discussed with attending physician, Leta Rice, who recommended pt receive neurological consult.

## 2024-03-09 NOTE — PROGRESS NOTES
4 Eyes Skin Assessment     NAME:  Yulisa Cesar  YOB: 1950  MEDICAL RECORD NUMBER:  [id number]    The patient is being assessed for  Admission    I agree that at least one RN has performed a thorough Head to Toe Skin Assessment on the patient. ALL assessment sites listed below have been assessed.      Areas assessed by both nurses:    Head, Face, Ears, Shoulders, Back, Chest, Arms, Elbows, Hands, Sacrum. Buttock, Coccyx, Ischium, Legs. Feet and Heels, and Under Medical Devices         Does the Patient have a Wound? Yes wound(s) were present on assessment. LDA wound assessment was Initiated and completed by RN       Vitaliy Prevention initiated by RN: Yes  Wound Care Orders initiated by RN: Yes    Pressure Injury (Stage 3,4, Unstageable, DTI, NWPT, and Complex wounds) if present, place Wound referral order by RN under : No    New Ostomies, if present place, Ostomy referral order under : No     Nurse 1 eSignature: Electronically signed by GUILLE GREER RN on 3/9/24 at 1:56 AM EST    **SHARE this note so that the co-signing nurse can place an eSignature**    Nurse 2 eSignature: Electronically signed by Naa Teixeira LPN on 3/9/24 at 6:02 AM EST

## 2024-03-09 NOTE — ED NOTES
Spoke with [facility] and advised attending at [facility] will not admit so we need to talk to Hospitalist again at [facility] and admit there.

## 2024-03-09 NOTE — H&P
History and Physical      Name:  Yulisa Cesar /Age/Sex: 1950  (73 y.o. female)   MRN & CSN:  [id number] & [id number] Encounter Date/Time: 3/9/2024 1:19 AM   Location:  [address] [place] [address]-A PCP: Teodoro Oliveros MD            Assessment and Plan:     Patient is a 73-year-old female who presented with fall. Transferred from [place] ED.     # Syncope secondary to orthostatic hypotension  - Endorsed syncopal episode at home without prodrome. No known seizure-activity or proceeding chest pain. No previous arrhthymias or cardiac history. Had episodes of systolic hypotension in 80's and 90's in ED despite IVF.   - On arrival, patient is clinically hypovolemic, has symptomatic orthostatic hypotension despite IVF. Tn negative x2. ECG without acute changes. CTPE non-acute. CTH and C-spine non-acute.   - Continue IVF and supportive care. Fall precautions. Telemetry.     # OLEG  - Recently started on Bactrim on 3/5. Also received contrast for CTPE in ED.   - Clinically hypovolemic. Cr 1.5, baseline ~ 0.7. UA ordered.  - Hold Lasix. Will challenge with IVF. Strict I/O's. Bladder scan if decreased voiding.    # Repeated falls  - Endorsed generalized weakness and repeat falls at home recently. Has walker and cane at home.  - Exam non-focal. CTH non-acute.   - PT/OT/CM consulted, appreciate assistance. Fall precautions. Follow-up labs.    # Hx of recurrent DVT/PE s/p thrombectomy in 2024  - Had transportation issues and difficulty maintaining therapeutic INR in the past making Coumadin less than ideal agent. RHC with elevated PA pressure of 38 in 2024. TTE in 3/2024 with normal RV size and function, improved from 2024 (mildly enlarged RV and moderately reduced RV function).   - Initially placed on 4L NC on arrival, weaned down to RA with SpO2 >98%.   - Continue Xarelto. Continue to monitor.     # GERD  - Continue PPI.    # Hx of CVA  - Continue Eliquis and Lipitor.    Checklist:  Advanced directive: full  Diet:  tobacco: Never   Substance and Sexual Activity    Alcohol use: No    Drug use: No       Medications Prior to Admission     Prior to Admission medications    Medication Sig Start Date End Date Taking? Authorizing Provider   Artificial Saliva (NEUTRASAL) PACK For dry mouth 11/8/19   Nils Solano MD   clindamycin (CLEOCIN) 150 MG capsule Take 150 mg by mouth Usually 4 before dental appts.    Nils Solano MD   Nutritional Supplements (BOOST HIGH PROTEIN) LIQD Take 1 Can by mouth Indications: Nutritional Support 3-4 days/week     Nils Solano MD   oxybutynin (DITROPAN) 5 MG tablet Take 5 mg by mouth 3 times daily Indications: Dysfunction of the Urinary Bladder     Nils Solano MD   topiramate (TOPAMAX) 25 MG tablet Take 50 mg by mouth 2 times daily Indications: Migraine Headache     Nils Solano MD   butalbital-acetaminophen-caffeine (FIORICET, ESGIC) -40 MG per tablet Take 1 tablet by mouth every 4 hours as needed for Headaches    Nils Solano MD   Acetaminophen (TYLENOL PO) Take 650 mg by mouth See Admin Instructions Indications: Pain Don't take more than 3,000 mg per day, including what's in Fioricet    Nils Solano MD   Multiple Vitamins-Minerals (THERAPEUTIC MULTIVITAMIN-MINERALS) tablet Take 2 tablets by mouth daily Indications: Treatment to Prevent Vitamin Deficiency     Nils Solano MD   Apoaequorin (PREVAGEN PO) Take 1 tablet by mouth daily     Nils Solano MD   atorvastatin (LIPITOR) 40 MG tablet Take 40 mg by mouth daily. Indications: Increase in the Amount of Cholesterol in the Blood    Nils Solano MD   fluoxetine (PROZAC) 40 MG capsule Take 40 mg by mouth daily. Indications: Depression    Nils Solano MD   Loratadine-Pseudoephedrine (CLARITIN-D 12 HOUR PO) Take 1 tablet by mouth daily as needed To decrease drainage.    Nils Solano MD   pantoprazole (PROTONIX) 40 MG tablet Take 40 mg by mouth

## 2024-03-09 NOTE — ED NOTES
Pt assised to BSC x2 heavy assist. Pericare completed. Pt assisted back to bed, given warm blankets. Call light within reach. Expressed no needs at this time. Sister of pt remains at bedside.

## 2024-03-09 NOTE — PLAN OF CARE
Problem: Discharge Planning  Goal: Discharge to home or other facility with appropriate resources  3/9/2024 1546 by Eddie Valero RN  Outcome: Progressing  3/9/2024 0225 by Naa Teixeira LPN  Outcome: Progressing  Flowsheets (Taken 3/9/2024 0223)  Discharge to home or other facility with appropriate resources:   Identify barriers to discharge with patient and caregiver   Identify discharge learning needs (meds, wound care, etc)   Refer to discharge planning if patient needs post-hospital services based on physician order or complex needs related to functional status, cognitive ability or social support system     Problem: Safety - Adult  Goal: Free from fall injury  3/9/2024 1546 by Eddie Valero RN  Outcome: Progressing  Flowsheets (Taken 3/9/2024 1546)  Free From Fall Injury:   Instruct family/caregiver on patient safety   Based on caregiver fall risk screen, instruct family/caregiver to ask for assistance with transferring infant if caregiver noted to have fall risk factors  3/9/2024 0225 by Naa Teixeira LPN  Outcome: Progressing     Problem: Skin/Tissue Integrity  Goal: Absence of new skin breakdown  Description: 1.  Monitor for areas of redness and/or skin breakdown  2.  Assess vascular access sites hourly  3.  Every 4-6 hours minimum:  Change oxygen saturation probe site  4.  Every 4-6 hours:  If on nasal continuous positive airway pressure, respiratory therapy assess nares and determine need for appliance change or resting period.  3/9/2024 1546 by Eddie Valero RN  Outcome: Progressing  3/9/2024 0225 by Naa Teixeira LPN  Outcome: Progressing

## 2024-03-09 NOTE — CONSULTS
[facility] ACUTE CARE PHYSICAL THERAPY EVALUATION  Yulisa Cesar, 1950, 4126/4126-A, 3/9/2024    Discharge Recommendation: SNF    Equipment: defer     History  [place]:  There were no encounter diagnoses.    Subjective:  Patient states:  agreeable to PT eval. Says \"head does not feel right\" post-activity. Orthostats negative  Pain: denies    Communication with other providers: OT  Restrictions: fall risk    Home Setup/Prior level of function  Social/Functional History  Lives With: Family  Type of Home: House  Home Layout: One level  Home Access: Level entry  Bathroom Shower/Tub: Walk-in shower  Bathroom Toilet: Standard  Bathroom Equipment: Built-in shower seat, Grab bars in shower, Toilet raiser  Home Equipment: Cane, Rollator  Has the patient had two or more falls in the past year or any fall with injury in the past year?: Yes  ADL Assistance: Independent  Homemaking Assistance: Independent  Ambulation Assistance: Independent (w/ SPC vs rollator prn)  Transfer Assistance: Independent  Active : No    Examination of body systems (includes body structures/functions, activity/participation limitations):  Observation:  Supine in bed upon arrival   Vision:  WFL  Hearing:  WFL  Cardiopulmonary:  VSS  Cognition: oriented to self, place, situation, not date/month/year. Pt appears intermittently confused throughout session but overall responding appropriately to questions/commands, see OT/SLP note for further evaluation.    Body Structures/Function  ROM R/L:  WFL.    Strength R/L:  4/5 grossly, weakness observed in function.    Neuro:  WFL      Mobility:  Rolling L/R:  CGA  Supine to sit:  CGA  Transfers: Min A with RW  Sitting balance:  CGA    Standing balance:  CGA with RW    Gait: CGA with RW 10 ft x 2    Comes to EOB with increased time and effort. Requires cues for safe hand placement and AD management with transfers, poor anterior weight shift, requires trunk boost to rise from EOB and toilet.

## 2024-03-10 PROBLEM — R91.1 LUNG NODULE: Status: ACTIVE | Noted: 2024-03-10

## 2024-03-10 PROBLEM — R59.0 MEDIASTINAL LYMPHADENOPATHY: Status: ACTIVE | Noted: 2024-03-10

## 2024-03-10 LAB
ALBUMIN SERPL-MCNC: 3.1 GM/DL (ref 3.4–5)
ALP BLD-CCNC: 90 IU/L (ref 40–128)
ALT SERPL-CCNC: 6 U/L (ref 10–40)
ANION GAP SERPL CALCULATED.3IONS-SCNC: 8 MMOL/L (ref 7–16)
AST SERPL-CCNC: 13 IU/L (ref 15–37)
BASOPHILS ABSOLUTE: 0 K/CU MM
BASOPHILS RELATIVE PERCENT: 0.6 % (ref 0–1)
BILIRUB SERPL-MCNC: 0.6 MG/DL (ref 0–1)
BUN SERPL-MCNC: 13 MG/DL (ref 6–23)
CALCIUM SERPL-MCNC: 8.8 MG/DL (ref 8.3–10.6)
CHLORIDE BLD-SCNC: 106 MMOL/L (ref 99–110)
CO2: 21 MMOL/L (ref 21–32)
CREAT SERPL-MCNC: 1.2 MG/DL (ref 0.6–1.1)
CRP SERPL HS-MCNC: 26.7 MG/L
DIFFERENTIAL TYPE: ABNORMAL
EOSINOPHILS ABSOLUTE: 0.3 K/CU MM
EOSINOPHILS RELATIVE PERCENT: 4.5 % (ref 0–3)
ERYTHROCYTE SEDIMENTATION RATE: 35 MM/HR (ref 0–30)
GFR SERPL CREATININE-BSD FRML MDRD: 48 ML/MIN/1.73M2
GLUCOSE SERPL-MCNC: 93 MG/DL (ref 70–99)
HCT VFR BLD CALC: 32.9 % (ref 37–47)
HEMOGLOBIN: 10.5 GM/DL (ref 12.5–16)
IMMATURE NEUTROPHIL %: 0.3 % (ref 0–0.43)
LYMPHOCYTES ABSOLUTE: 1.1 K/CU MM
LYMPHOCYTES RELATIVE PERCENT: 16.3 % (ref 24–44)
MAGNESIUM: 1.9 MG/DL (ref 1.8–2.4)
MCH RBC QN AUTO: 30.3 PG (ref 27–31)
MCHC RBC AUTO-ENTMCNC: 31.9 % (ref 32–36)
MCV RBC AUTO: 94.8 FL (ref 78–100)
MONOCYTES ABSOLUTE: 0.5 K/CU MM
MONOCYTES RELATIVE PERCENT: 8.4 % (ref 0–4)
NUCLEATED RBC %: 0 %
PDW BLD-RTO: 13.8 % (ref 11.7–14.9)
PLATELET # BLD: 226 K/CU MM (ref 140–440)
PMV BLD AUTO: 10.8 FL (ref 7.5–11.1)
POTASSIUM SERPL-SCNC: 4 MMOL/L (ref 3.5–5.1)
RBC # BLD: 3.47 M/CU MM (ref 4.2–5.4)
SEGMENTED NEUTROPHILS ABSOLUTE COUNT: 4.5 K/CU MM
SEGMENTED NEUTROPHILS RELATIVE PERCENT: 69.9 % (ref 36–66)
SODIUM BLD-SCNC: 135 MMOL/L (ref 135–145)
TOTAL IMMATURE NEUTOROPHIL: 0.02 K/CU MM
TOTAL NUCLEATED RBC: 0 K/CU MM
TOTAL PROTEIN: 6.1 GM/DL (ref 6.4–8.2)
WBC # BLD: 6.4 K/CU MM (ref 4–10.5)

## 2024-03-10 PROCEDURE — 97110 THERAPEUTIC EXERCISES: CPT

## 2024-03-10 PROCEDURE — 83615 LACTATE (LD) (LDH) ENZYME: CPT

## 2024-03-10 PROCEDURE — 83010 ASSAY OF HAPTOGLOBIN QUANT: CPT

## 2024-03-10 PROCEDURE — 80053 COMPREHEN METABOLIC PANEL: CPT

## 2024-03-10 PROCEDURE — 99222 1ST HOSP IP/OBS MODERATE 55: CPT | Performed by: INTERNAL MEDICINE

## 2024-03-10 PROCEDURE — 85025 COMPLETE CBC W/AUTO DIFF WBC: CPT

## 2024-03-10 PROCEDURE — 83735 ASSAY OF MAGNESIUM: CPT

## 2024-03-10 PROCEDURE — 85652 RBC SED RATE AUTOMATED: CPT

## 2024-03-10 PROCEDURE — 6370000000 HC RX 637 (ALT 250 FOR IP): Performed by: INTERNAL MEDICINE

## 2024-03-10 PROCEDURE — 6360000002 HC RX W HCPCS: Performed by: STUDENT IN AN ORGANIZED HEALTH CARE EDUCATION/TRAINING PROGRAM

## 2024-03-10 PROCEDURE — 86140 C-REACTIVE PROTEIN: CPT

## 2024-03-10 PROCEDURE — 83550 IRON BINDING TEST: CPT

## 2024-03-10 PROCEDURE — 85045 AUTOMATED RETICULOCYTE COUNT: CPT

## 2024-03-10 PROCEDURE — 36415 COLL VENOUS BLD VENIPUNCTURE: CPT

## 2024-03-10 PROCEDURE — 83540 ASSAY OF IRON: CPT

## 2024-03-10 PROCEDURE — 82728 ASSAY OF FERRITIN: CPT

## 2024-03-10 PROCEDURE — 1200000000 HC SEMI PRIVATE

## 2024-03-10 PROCEDURE — 2580000003 HC RX 258: Performed by: STUDENT IN AN ORGANIZED HEALTH CARE EDUCATION/TRAINING PROGRAM

## 2024-03-10 PROCEDURE — 6370000000 HC RX 637 (ALT 250 FOR IP): Performed by: NURSE PRACTITIONER

## 2024-03-10 PROCEDURE — 94761 N-INVAS EAR/PLS OXIMETRY MLT: CPT

## 2024-03-10 PROCEDURE — 97530 THERAPEUTIC ACTIVITIES: CPT

## 2024-03-10 PROCEDURE — 6370000000 HC RX 637 (ALT 250 FOR IP): Performed by: STUDENT IN AN ORGANIZED HEALTH CARE EDUCATION/TRAINING PROGRAM

## 2024-03-10 RX ORDER — FOLIC ACID 1 MG/1
1 TABLET ORAL DAILY
Status: DISCONTINUED | OUTPATIENT
Start: 2024-03-10 | End: 2024-03-15 | Stop reason: HOSPADM

## 2024-03-10 RX ORDER — LANOLIN ALCOHOL/MO/W.PET/CERES
1000 CREAM (GRAM) TOPICAL DAILY
Status: DISCONTINUED | OUTPATIENT
Start: 2024-03-10 | End: 2024-03-15 | Stop reason: HOSPADM

## 2024-03-10 RX ORDER — VITAMIN B COMPLEX
2000 TABLET ORAL DAILY
Status: DISCONTINUED | OUTPATIENT
Start: 2024-03-10 | End: 2024-03-15 | Stop reason: HOSPADM

## 2024-03-10 RX ADMIN — ATORVASTATIN CALCIUM 40 MG: 40 TABLET, FILM COATED ORAL at 10:02

## 2024-03-10 RX ADMIN — SODIUM CHLORIDE, PRESERVATIVE FREE 5 ML: 5 INJECTION INTRAVENOUS at 20:01

## 2024-03-10 RX ADMIN — ENOXAPARIN SODIUM 40 MG: 100 INJECTION SUBCUTANEOUS at 18:02

## 2024-03-10 RX ADMIN — OXYBUTYNIN CHLORIDE 5 MG: 5 TABLET ORAL at 10:02

## 2024-03-10 RX ADMIN — TOPIRAMATE 50 MG: 25 TABLET, FILM COATED ORAL at 10:02

## 2024-03-10 RX ADMIN — PANTOPRAZOLE SODIUM 40 MG: 40 TABLET, DELAYED RELEASE ORAL at 06:20

## 2024-03-10 RX ADMIN — Medication 2000 UNITS: at 14:54

## 2024-03-10 RX ADMIN — FLUOXETINE HYDROCHLORIDE 40 MG: 20 CAPSULE ORAL at 10:02

## 2024-03-10 RX ADMIN — Medication 1000 MCG: at 14:54

## 2024-03-10 RX ADMIN — FOLIC ACID 1 MG: 1 TABLET ORAL at 14:54

## 2024-03-10 RX ADMIN — OXYBUTYNIN CHLORIDE 5 MG: 5 TABLET ORAL at 14:54

## 2024-03-10 RX ADMIN — TOPIRAMATE 50 MG: 25 TABLET, FILM COATED ORAL at 20:01

## 2024-03-10 RX ADMIN — OXYBUTYNIN CHLORIDE 5 MG: 5 TABLET ORAL at 20:01

## 2024-03-10 ASSESSMENT — PAIN SCALES - GENERAL: PAINLEVEL_OUTOF10: 0

## 2024-03-10 NOTE — PROGRESS NOTES
V2.0  [facility] [place]      Name:  Yulisa Cesar /Age/Sex: 1950  (73 y.o. female)   MRN & CSN:  [id number] & [id number] Encounter Date/Time: 3/10/2024 8:18 AM EDT    Location:  [address] [place] [address]-A PCP: Teodoro Oliveros MD       Hospital Day: 2    Assessment and Plan:   Yulisa Cesar is a 73 y.o. female with pmh of CVA, recurrent DVT/PE s/p thrombectomy in 2024, GERD and IBS,  who presents with Fall and  OLEG (acute kidney injury) (HCC)      Plan:    Fall   Syncope secondary to orthostatic hypotension  - Endorsed syncopal episode at home without prodrome. No known seizure-activity or proceeding chest pain. No previous arrhthymias or cardiac history. Had episodes of systolic hypotension in 80's and 90's in ED despite IVF.   - On arrival, patient is clinically hypovolemic, has symptomatic orthostatic hypotension despite IVF. Tn negative x2. ECG without acute changes. CTPE non-acute. CTH and C-spine non-acute.   - Continue IVF and supportive care. Fall precautions. Telemetry.      OLEG - improving   - Recently started on Bactrim on 3/5. Also received contrast for CTPE in ED.   - Clinically hypovolemic. Cr 1.5, baseline ~ 0.7, today sCr 1.2   - Hold Lasix. Will challenge with IVF. Strict I/O's. Bladder scan if decreased voiding.     Repeated falls  - Endorsed generalized weakness and repeat falls at home recently. Has walker and cane at home.  - Exam non-focal. CTH non-acute.   - PT/OT/CM consulted, appreciate assistance. Fall precautions. Follow-up labs.    Right Lung [place] Nodularity   Mediastinal and Pre-tracheal Lymphadenopathy  -seen on CT scans in [place] ED, PET scan was recommended   -Consult Hem/Onc: Dr. Garcia, appreciate recs  -CRP 26.7, ESR 35    Severe Protein Calorie Malnutrition   -has had poor appetite x 3 months with 15 lb weight loss  -Consulted Dietary, added protein supplements TID      Hx of recurrent DVT/PE s/p thrombectomy in 2024  - Had transportation issues and  size, and use of iterative reconstruction technique. IV contrast: None. FINDINGS: CEREBELLAR TONSILS: Normal. ALIGNMENT: There is grade 1 anterolisthesis of C4 upon C5. Alignment of the cervical spine is otherwise anatomic. BONES: No fracture or destructive osseous process. NECK SOFT TISSUES: Normal. VISUALIZED LUNG/MEDIASTINUM: Incidental note is made of mediastinal lymphadenopathy, with a anterior pretracheal node measuring 19 x 9 mm.. There is hypertrophy and degenerative changes noted at the atlantoaxial articulation. Disc osteophyte complex noted at C4-5 resulting in mild narrowing of the central canal. Mild multilevel uncovertebral hypertrophy is noted.     1. No acute abnormality of the cervical spine. 2. Pretracheal lymphadenopathy. This is nonspecific. Clinical correlation is necessary. Electronically signed by Bernardo Sandoval MD    CT HEAD WO CONTRAST    Result Date: 3/8/2024  EXAM: CT HEAD WO CONTRAST INDICATION: fall; head trauma COMPARISON: None. TECHNICAL: Axial CT imaging obtained from vertex to skull base. Axial images and multiplanar reformatted images reviewed.   Up-to-date CT equipment and radiation dose reduction techniques were employed. IV Contrast: None.  FINDINGS: INTRACRANIAL HEMORRHAGE: None. VENTRICLES: The ventricles are enlarged, however, symmetric. The remaining midline. BRAIN PARENCHYMA: Periventricular and subcortical hypoattenuation is noted. Remote infarct right frontal lobe. Gray-white matter differentiation is normal. No intracranial mass effect. SKULL: No destructive osseous process or fracture. PARANASAL SINUSES AND MASTOIDS: No acute sinusitis or mastoiditis. ORBITS: Normal. EXTRACRANIAL SOFT TISSUES: Normal.     No acute intracranial abnormality. Age-related atrophy with sequelae of microvascular ischemic change. Electronically signed by Bernardo Sandoval MD      Electronically signed by CELIA LAGOS CNP on 3/10/2024 at 1:12 PM

## 2024-03-10 NOTE — PLAN OF CARE
Problem: Discharge Planning  Goal: Discharge to home or other facility with appropriate resources  3/10/2024 1510 by Eddie Valero RN  Outcome: Progressing  3/10/2024 0148 by Aruna Jarrett LPN  Outcome: Progressing     Problem: Safety - Adult  Goal: Free from fall injury  3/10/2024 1510 by Eddie Valero RN  Outcome: Progressing  Flowsheets (Taken 3/10/2024 1508)  Free From Fall Injury:   Instruct family/caregiver on patient safety   Based on caregiver fall risk screen, instruct family/caregiver to ask for assistance with transferring infant if caregiver noted to have fall risk factors  3/10/2024 0148 by Aruna Jarrett LPN  Outcome: Progressing     Problem: Skin/Tissue Integrity  Goal: Absence of new skin breakdown  Description: 1.  Monitor for areas of redness and/or skin breakdown  2.  Assess vascular access sites hourly  3.  Every 4-6 hours minimum:  Change oxygen saturation probe site  4.  Every 4-6 hours:  If on nasal continuous positive airway pressure, respiratory therapy assess nares and determine need for appliance change or resting period.  3/10/2024 1510 by Eddie Valero RN  Outcome: Progressing  3/10/2024 0148 by Aruna Jarrett LPN  Outcome: Progressing     Problem: Pain  Goal: Verbalizes/displays adequate comfort level or baseline comfort level  Outcome: Progressing     Problem: Chronic Conditions and Co-morbidities  Goal: Patient's chronic conditions and co-morbidity symptoms are monitored and maintained or improved  Outcome: Progressing

## 2024-03-10 NOTE — FLOWSHEET NOTE
03/10/24 1455   Vital Signs   Blood Pressure Lying 104/73   Pulse Lying 83 PER MINUTE   Blood Pressure Sitting 122/68   Pulse Sitting 93 PER MINUTE   Blood Pressure Standing 107/77   Pulse Standing 116 PER MINUTE

## 2024-03-10 NOTE — CARE COORDINATION
CM received consult and reviewed chart. CM met with pt at bedside and discussed PT/OT recs. CM provided pt with a list of SNF choices. Pt is agreeable for SNF. Pt asked for a little time to discuss choice with family. CM left phone number so pt can call CM with choice today. CM will continue following for dc needs.

## 2024-03-10 NOTE — CONSULTS
Patient Name:  Yulisa Cesar  Patient :  1950  Patient MRN:  [id number]     Primary Oncologist: Micheal Garcia MD  Referring Provider: Teodoro Oliveros MD     Date of Service: 3/8/2024      Reason for Consult: To evaluate the patient with mediastinal lymphadenopathy and right apical lung nodularity.      Chief Complaint:  Fall at home.     Patient Active Problem List:     Pulmonary nodule     DVT, recurrent, lower extremity, acute (HCC)     Dysphagia     Abdominal pain     Anticoagulated on Coumadin     Hx-TIA (transient ischemic attack)     Overactive bladder     OLEG (acute kidney injury) (HCC)    HPI:   Yulisa Cesar is a 73 y.o. female with medical history significant for history of CVA, recurrent DVT/PE, s/p thrombectomy in 2024, GERD and IBS presented to the [place] ED on 3/9/24 with fall.     Endorsed having a fall at home that was not preceded by dizziness or visual changes. Hit head with fall. No known seizure-activity or proceeding chest pain. After getting up, had left-sided chest pain. Endorsed generalized weakness and repeat falls at home recently. Has walker and cane at home. Denied any emesis or decreased oral intake. Denied any fevers, chills, SOB, cough, abdominal pain, C/D or urinary changes.    CTA chest on admisstion, 3/8/24 showed no evidence of PE. Mediastinal lymphadenopathy, indeterminate. Pulmonary nodularity or nodular scarring particularly of the right apex. This is indeterminate.      Since she was found to have abnormal CT scans, I was called to evaluate her.     She has lost about 15 lbs over last 3 months. Also endorsed drenching night sweat often. Denies unexplained fever.     Past Medical History:     Past Medical History:   Diagnosis Date    Acute infection     Asthma     Blood clot in vein     Bronchitis     Clotting disorder (HCC)     Colitis     at the age of 16    Depression     Diverticulosis     Endometriosis     Factor 5 Leiden mutation, heterozygous (HCC)

## 2024-03-10 NOTE — PROGRESS NOTES
Rollator  Has the patient had two or more falls in the past year or any fall with injury in the past year?: Yes  ADL Assistance: Independent  Homemaking Assistance: Independent  Ambulation Assistance: Independent (w/ SPC vs rollator prn)  Transfer Assistance: Independent  Active : No             Electronically signed by:    Kate Coker PTA  3/10/2024, 2:51 PM

## 2024-03-10 NOTE — PLAN OF CARE
Problem: Discharge Planning  Goal: Discharge to home or other facility with appropriate resources  3/10/2024 0148 by Aruna Jarrett LPN  Outcome: Progressing  3/9/2024 1546 by Eddie Valero RN  Outcome: Progressing     Problem: Safety - Adult  Goal: Free from fall injury  3/10/2024 0148 by Aruna Jarrett LPN  Outcome: Progressing  3/9/2024 1546 by Eddie Valero, RN  Outcome: Progressing  Flowsheets (Taken 3/9/2024 1546)  Free From Fall Injury:   Instruct family/caregiver on patient safety   Based on caregiver fall risk screen, instruct family/caregiver to ask for assistance with transferring infant if caregiver noted to have fall risk factors     Problem: Skin/Tissue Integrity  Goal: Absence of new skin breakdown  Description: 1.  Monitor for areas of redness and/or skin breakdown  2.  Assess vascular access sites hourly  3.  Every 4-6 hours minimum:  Change oxygen saturation probe site  4.  Every 4-6 hours:  If on nasal continuous positive airway pressure, respiratory therapy assess nares and determine need for appliance change or resting period.  3/10/2024 0148 by Aruna Jarrett LPN  Outcome: Progressing  3/9/2024 1546 by Eddie Valero, RN  Outcome: Progressing

## 2024-03-11 LAB
ALBUMIN SERPL-MCNC: 2.9 GM/DL (ref 3.4–5)
ALP BLD-CCNC: 86 IU/L (ref 40–129)
ALT SERPL-CCNC: 6 U/L (ref 10–40)
ANION GAP SERPL CALCULATED.3IONS-SCNC: 8 MMOL/L (ref 7–16)
AST SERPL-CCNC: 11 IU/L (ref 15–37)
BASOPHILS ABSOLUTE: 0 K/CU MM
BASOPHILS RELATIVE PERCENT: 0.5 % (ref 0–1)
BILIRUB SERPL-MCNC: 0.4 MG/DL (ref 0–1)
BUN SERPL-MCNC: 13 MG/DL (ref 6–23)
CALCIUM SERPL-MCNC: 8.2 MG/DL (ref 8.3–10.6)
CHLORIDE BLD-SCNC: 107 MMOL/L (ref 99–110)
CO2: 21 MMOL/L (ref 21–32)
CREAT SERPL-MCNC: 1.3 MG/DL (ref 0.6–1.1)
CRP SERPL HS-MCNC: 36.3 MG/L
DIFFERENTIAL TYPE: ABNORMAL
EOSINOPHILS ABSOLUTE: 0.5 K/CU MM
EOSINOPHILS RELATIVE PERCENT: 6.2 % (ref 0–3)
GFR SERPL CREATININE-BSD FRML MDRD: 43 ML/MIN/1.73M2
GLUCOSE SERPL-MCNC: 111 MG/DL (ref 70–99)
HCT VFR BLD CALC: 32.2 % (ref 37–47)
HEMOGLOBIN: 10.3 GM/DL (ref 12.5–16)
IMMATURE NEUTROPHIL %: 0.3 % (ref 0–0.43)
LYMPHOCYTES ABSOLUTE: 1.6 K/CU MM
LYMPHOCYTES RELATIVE PERCENT: 22.3 % (ref 24–44)
MAGNESIUM: 1.9 MG/DL (ref 1.8–2.4)
MCH RBC QN AUTO: 30.7 PG (ref 27–31)
MCHC RBC AUTO-ENTMCNC: 32 % (ref 32–36)
MCV RBC AUTO: 96.1 FL (ref 78–100)
MONOCYTES ABSOLUTE: 0.8 K/CU MM
MONOCYTES RELATIVE PERCENT: 10.8 % (ref 0–4)
NUCLEATED RBC %: 0 %
PDW BLD-RTO: 13.7 % (ref 11.7–14.9)
PLATELET # BLD: 209 K/CU MM (ref 140–440)
PMV BLD AUTO: 10.7 FL (ref 7.5–11.1)
POTASSIUM SERPL-SCNC: 4.3 MMOL/L (ref 3.5–5.1)
RBC # BLD: 3.35 M/CU MM (ref 4.2–5.4)
SEGMENTED NEUTROPHILS ABSOLUTE COUNT: 4.4 K/CU MM
SEGMENTED NEUTROPHILS RELATIVE PERCENT: 59.9 % (ref 36–66)
SODIUM BLD-SCNC: 136 MMOL/L (ref 135–145)
TOTAL IMMATURE NEUTOROPHIL: 0.02 K/CU MM
TOTAL NUCLEATED RBC: 0 K/CU MM
TOTAL PROTEIN: 5.7 GM/DL (ref 6.4–8.2)
WBC # BLD: 7.3 K/CU MM (ref 4–10.5)

## 2024-03-11 PROCEDURE — 85025 COMPLETE CBC W/AUTO DIFF WBC: CPT

## 2024-03-11 PROCEDURE — 94761 N-INVAS EAR/PLS OXIMETRY MLT: CPT

## 2024-03-11 PROCEDURE — 6370000000 HC RX 637 (ALT 250 FOR IP): Performed by: NURSE PRACTITIONER

## 2024-03-11 PROCEDURE — 99231 SBSQ HOSP IP/OBS SF/LOW 25: CPT | Performed by: PHYSICIAN ASSISTANT

## 2024-03-11 PROCEDURE — 97116 GAIT TRAINING THERAPY: CPT

## 2024-03-11 PROCEDURE — 86140 C-REACTIVE PROTEIN: CPT

## 2024-03-11 PROCEDURE — 36415 COLL VENOUS BLD VENIPUNCTURE: CPT

## 2024-03-11 PROCEDURE — 6370000000 HC RX 637 (ALT 250 FOR IP): Performed by: INTERNAL MEDICINE

## 2024-03-11 PROCEDURE — 2580000003 HC RX 258: Performed by: STUDENT IN AN ORGANIZED HEALTH CARE EDUCATION/TRAINING PROGRAM

## 2024-03-11 PROCEDURE — 97530 THERAPEUTIC ACTIVITIES: CPT

## 2024-03-11 PROCEDURE — 2580000003 HC RX 258: Performed by: NURSE PRACTITIONER

## 2024-03-11 PROCEDURE — 83735 ASSAY OF MAGNESIUM: CPT

## 2024-03-11 PROCEDURE — 6370000000 HC RX 637 (ALT 250 FOR IP): Performed by: STUDENT IN AN ORGANIZED HEALTH CARE EDUCATION/TRAINING PROGRAM

## 2024-03-11 PROCEDURE — 1200000000 HC SEMI PRIVATE

## 2024-03-11 PROCEDURE — 80053 COMPREHEN METABOLIC PANEL: CPT

## 2024-03-11 RX ORDER — SODIUM CHLORIDE 9 MG/ML
INJECTION, SOLUTION INTRAVENOUS CONTINUOUS
Status: DISPENSED | OUTPATIENT
Start: 2024-03-11 | End: 2024-03-12

## 2024-03-11 RX ORDER — SODIUM CHLORIDE 9 MG/ML
INJECTION, SOLUTION INTRAVENOUS CONTINUOUS
Status: DISPENSED | OUTPATIENT
Start: 2024-03-11 | End: 2024-03-11

## 2024-03-11 RX ADMIN — ATORVASTATIN CALCIUM 40 MG: 40 TABLET, FILM COATED ORAL at 10:58

## 2024-03-11 RX ADMIN — OXYBUTYNIN CHLORIDE 5 MG: 5 TABLET ORAL at 22:38

## 2024-03-11 RX ADMIN — Medication 2000 UNITS: at 10:58

## 2024-03-11 RX ADMIN — TOPIRAMATE 50 MG: 25 TABLET, FILM COATED ORAL at 10:58

## 2024-03-11 RX ADMIN — OXYBUTYNIN CHLORIDE 5 MG: 5 TABLET ORAL at 10:58

## 2024-03-11 RX ADMIN — Medication 1000 MCG: at 10:58

## 2024-03-11 RX ADMIN — SODIUM CHLORIDE: 9 INJECTION, SOLUTION INTRAVENOUS at 11:05

## 2024-03-11 RX ADMIN — RIVAROXABAN 15 MG: 15 TABLET, FILM COATED ORAL at 22:38

## 2024-03-11 RX ADMIN — TOPIRAMATE 50 MG: 25 TABLET, FILM COATED ORAL at 22:38

## 2024-03-11 RX ADMIN — FOLIC ACID 1 MG: 1 TABLET ORAL at 10:58

## 2024-03-11 RX ADMIN — SODIUM CHLORIDE, PRESERVATIVE FREE 10 ML: 5 INJECTION INTRAVENOUS at 11:04

## 2024-03-11 RX ADMIN — Medication 3 MG: at 22:38

## 2024-03-11 RX ADMIN — PANTOPRAZOLE SODIUM 40 MG: 40 TABLET, DELAYED RELEASE ORAL at 06:29

## 2024-03-11 RX ADMIN — FLUOXETINE HYDROCHLORIDE 40 MG: 20 CAPSULE ORAL at 10:57

## 2024-03-11 NOTE — PROGRESS NOTES
Patient Name:  Yulisa Cesar  Patient :  1950  Patient MRN:  [id number]     Primary Oncologist: Micheal Garcia MD  Referring Provider: Teodoro Oliveros MD     Date of Service: 3/8/2024      Reason for Consult: To evaluate the patient with mediastinal lymphadenopathy and right apical lung nodularity.      Chief Complaint:  Fall at home.     Patient Active Problem List:     Pulmonary nodule     DVT, recurrent, lower extremity, acute (HCC)     Dysphagia     Abdominal pain     Anticoagulated on Coumadin     Hx-TIA (transient ischemic attack)     Overactive bladder     OLEG (acute kidney injury) (HCC)    HPI:   Yulisa Cesar is a 73 y.o. female with medical history significant for history of CVA, recurrent DVT/PE, s/p thrombectomy in 2024, GERD and IBS presented to the [place] ED on 3/9/24 with fall.     Endorsed having a fall at home that was not preceded by dizziness or visual changes. Hit head with fall. No known seizure-activity or proceeding chest pain. After getting up, had left-sided chest pain. Endorsed generalized weakness and repeat falls at home recently. Has walker and cane at home. Denied any emesis or decreased oral intake. Denied any fevers, chills, SOB, cough, abdominal pain, C/D or urinary changes.    CTA chest on admisstion, 3/8/24 showed no evidence of PE. Mediastinal lymphadenopathy, indeterminate. Pulmonary nodularity or nodular scarring particularly of the right apex. This is indeterminate.      Since she was found to have abnormal CT scans, I was called to evaluate her.     She has lost about 15 lbs over last 3 months. Also endorsed drenching night sweat often. Denies unexplained fever.     Interval History    3/11/2024    Seen with daughter at bedside.  Feels fairly well today. Ate most of her breakfast. Reviewed plan which they verbalize understanding.     Labs this am with Cr 1.3, Ca 8.2, normal when corrected for albumin 2.9, Hgb 10.3, Plt 209k, WBC 7.3.    Past Medical History:    lymphadenopathy and RUL nodularity. She has weight loss and night sweat.     I will request LDH level today. I agree with radiologist and I will plan for PET/CT scan as an outpatient. If she is found to have FDG avid lung nodule and lymphadenopathy, I will consider tissue biopsy.     Normocytic normochromic anemia - Will request iron study, reticulocyte count and haptoglobin for her anemia. These were ordered and not drawn, reordered.    Low folate level, daily folate supplementation added.. Her B12 level, TSH and bilirubin were normal.     Please continue other current management as per primary team.     Patient was seen independently with attending physician Dr Jose ga for consultation.    Rosa Cain PA-C    We will continue to follow, please call with any questions.

## 2024-03-11 NOTE — CONSULTS
Comprehensive Nutrition Assessment    Type and Reason for Visit:  Initial, Consult (Poor Intake/Appetite 5 or more days, Unintentional Weight Loss)    Nutrition Recommendations/Plan:   Continue Regular Diet  Modify oral supplements to with meals to ensure their delivery     Malnutrition Assessment:  Malnutrition Status:  At risk for malnutrition  (03/11/24 1610)    Context:  Acute Illness       Nutrition Assessment:    Pt admitted with a fall, OLEG. H/O CVA, recurrent DVT/PE s/p thrombectomy in 1/2024, GERD and IBS. Reports weakness, poor appetite x 3 months with 15 lb weight loss PTA. Reports eating well here, consuming greater than half to all of meals on Regular Diet. Will modify her supplements to come with her trays, encouraged consumption between meals. Her wt history is quite dated, ALEJANDRA recent loss. Will continue to follow as moderate nutrition risk.    Nutrition Related Findings:    Cr 1.3, GFR 43   Wound Type:  (biopsy site)       Current Nutrition Intake & Therapies:    Average Meal Intake: 51-75%, %  Average Supplements Intake:  (not receiving as snacks)  ADULT DIET; Regular  ADULT ORAL NUTRITION SUPPLEMENT; AM Snack, PM Snack, HS Snack; Standard High Calorie/High Protein Oral Supplement    Anthropometric Measures:  Height: 172.7 cm (5' 8\")  Ideal Body Weight (IBW): 140 lbs (64 kg)    Admission Body Weight: 88.5 kg (195 lb 1.7 oz)  Current Body Weight: 87.7 kg (193 lb 5.5 oz),   IBW.    Current BMI (kg/m2): 29.4  Usual Body Weight:  (200 lb in 2017)                       BMI Categories: Overweight (BMI 25.0-29.9)    Estimated Daily Nutrient Needs:  Energy Requirements Based On: Kcal/kg  Weight Used for Energy Requirements: Ideal  Energy (kcal/day): 8361-8292 (25-30 kcal/kg IBW)  Weight Used for Protein Requirements: Ideal  Protein (g/day): 64-77 (1-1.2 g/kg IBW)  Method Used for Fluid Requirements: 1 ml/kcal  Fluid (ml/day): 1653-6264    Nutrition Diagnosis:   Inadequate oral intake related to acute  injury/trauma as evidenced by poor intake prior to admission, weight loss    Nutrition Interventions:   Food and/or Nutrient Delivery: Continue Current Diet, Modify Oral Nutrition Supplement, Snacks (Comment)  Nutrition Education/Counseling: Education initiated  Coordination of Nutrition Care: Continue to monitor while inpatient  Plan of Care discussed with: Pt    Goals:     Goals: Meet at least 75% of estimated needs       Nutrition Monitoring and Evaluation:   Behavioral-Environmental Outcomes: None Identified  Food/Nutrient Intake Outcomes: Food and Nutrient Intake, Supplement Intake  Physical Signs/Symptoms Outcomes: Biochemical Data, Chewing or Swallowing, GI Status, Nausea or Vomiting, Meal Time Behavior, Nutrition Focused Physical Findings, Skin, Weight    Discharge Planning:    Continue Oral Nutrition Supplement     Terri Martel RD, LD  Contact: [phone]

## 2024-03-11 NOTE — PROGRESS NOTES
Physical Therapy    Physical Therapy Treatment Note  Name: Yulisa Cesar MRN: [id number] :   1950   Date:  3/11/2024   Admission Date: 3/9/2024 Room:  [address] [place] [address]-A   Discharge recommendation: SNF     Restrictions/Precautions: general precautions, fall risk     Communication with other providers:  RN reports pt is appropriate to participate with PT.     Subjective:  Patient states:  \"I'm just tired\"  Pain:   Location, Type, Intensity (0/10 to 10/10):  pt denied having any pain    Objective:    Observation:  pt found sleeping in recliner chair upon arrival, easily wakes with verbal cues, pleasant and agreeable to participate with PT.   Objective Measures:  tele, on room air, vitals stable throughout, IV     Treatment, including education/measures:  Seated therapeutic exercises at edge of recliner chair:   -heel/toe raises x15 each   -LAQ x15 each   -Marching x15 each   -Hip add with manual resistance x15 each   -Hip abd with manual resistance x15 each     Sit to stand: CGA using FWW from recliner. ModA using grab bar/FWW from low toilet. Cues provided for hand placement with good carryover.     Stand to sit: CGA. Good eccentric control. Cues provided for hand placement.     Functional transfers: CGA using FWW. Cues provided for hip/pelvic alignment.     Sit balance: good, supervision. Pt was able to complete pericare independently.     Stand balance: good, using no AD (FWW directly in front for support), CGA. Pt was able to doff/don briefs and wash/dried hands at sink.     Gait: 50ft + 10ft using FWW with CGA. Gait speed is slow with reduced step length and foot clearance. Noted kyphotic posture with downward gaze - improvements when cues are provided. No noted instability, LOB, or knee buckling throughout.     Education: energy conservation, pursed lip breathing     Safety: gait belt used, pt remained in recliner chair at end of session, chair alarm intact, call light/phone placed in reach, all needs met.      Assessment / Impression:    Pt progressing well with therapy. Continues to easily fatigue and becomes SOB. Requires seated rest breaks to recover.     Patient's tolerance of treatment:  good   Adverse Reaction: none  Significant change in status and impact:  none   Barriers to improvement:  SOB, activity tolerance, generalized weakness     Plan for Next Session:    Continue current PT POC and progress mobility, as appropriate and tolerable.     Time in:  1512  Time out:  1536  Timed treatment minutes:  24  Total treatment time:  24    Previously filed items:  Social/Functional History  Lives With: Family  Type of Home: House  Home Layout: One level  Home Access: Level entry  Bathroom Shower/Tub: Walk-in shower  Bathroom Toilet: Standard  Bathroom Equipment: Built-in shower seat, Grab bars in shower, Toilet raiser  Home Equipment: Cane, Rollator  Has the patient had two or more falls in the past year or any fall with injury in the past year?: Yes  ADL Assistance: Independent  Homemaking Assistance: Independent  Ambulation Assistance: Independent (w/ SPC vs rollator prn)  Transfer Assistance: Independent  Active : No             Electronically signed by:    Duncan Frederick, PT  3/11/2024, 4:40 PM

## 2024-03-11 NOTE — PROGRESS NOTES
Pt KAT Matthew from [facility] called for an updated. Verbal consent was given by pt to give an update.

## 2024-03-11 NOTE — PROGRESS NOTES
V2.0  [facility] Hospitalist Progress Note      Name:  Yulisa Cesar /Age/Sex: 1950  (73 y.o. female)   MRN & CSN:  [id number] & [id number] Encounter Date/Time: 3/11/2024 12:52 PM EDT    Location:  [address] [place] [address]-A PCP: Teodoro Oliveros MD       Hospital Day: 3    Assessment and Plan:   Yulisa Cesar is a 73 y.o. female  who presents with OLEG (acute kidney injury) (HCC)    Syncope secondary to orthostatic hypotension-positive orthostasis on admission status post IV hydration.  EKG with no acute ischemia.  CT a chest negative for PE.  CT head and and C-spine nonacute   -Continue fall precautions, will give limited IV fluids due to orthostasis noted on yesterday's vitals, continue fall precautions and monitoring on telemetry.  Repeat orthostatic vital signs    Acute kidney injury, GFR 43-creatinine 0.72\.  It appears to be trending upward likely prerenal as family and patient stating decreased oral intake, recent BPs normotensive.  Will give IV hydration, repeat CHEM in a.m., will also check PVR, avoid NSAIDs, keep MAP greater than 65    Repeated falls-imaging with no acute injury.  PT OT consulted recommending SNF, case management following, pre-CERT pending.  Continue fall precautions    Severe protein calorie malnutrition-dietary following, on oral supplements    History recurrent DVT\PE status post thrombectomy 2024-on Xarelto.  (RHC with elevated PA pressure of 38 in 2024. TTE in 3/2024 with normal RV size and function, improved from 2024 (mildly enlarged RV and moderately reduced RV function).     Mediastinal lymphadenopathy, pretracheal lymph node 1.5 x 1.0 cm, right apical lung nodularity-history of weight loss and night sweats.  Heme/Oncology consulted planning PET\CT scan as outpatient.    Chronic anemia-hemoglobin stable.  Low folate level noted, started on folic acid per heme oncology    Vitamin D deficiency-patient with low vitamin D level 19, started on vitamin D oral  Collection Time: 03/11/24  1:44 AM   Result Value Ref Range    Magnesium 1.9 1.8 - 2.4 mg/dl        Imaging/Diagnostics Last 24 Hours   No results found.    Electronically signed by CELIA Kenny CNP on 3/11/2024 at 12:52 PM

## 2024-03-11 NOTE — CARE COORDINATION
Chart reviewed and met w/ pt for continued discharge planning. CM introduced self and explained role. Pts sister at bedside, permission shoaib to speak in front of sister. CM discussed previous therapy recommendations for SNF. Pt in agreement and would like [facility] as first choice and [facility] as second.     11:30 AM Referral called to [facility] and information left on confidential admission voicemail for Monica. CM did request return call once information reviewed.     3:01 PM Call to [facility], admissions office, to check status of referral. Voicemail left with request for return call.

## 2024-03-12 LAB
ALBUMIN SERPL-MCNC: 2.9 GM/DL (ref 3.4–5)
ALP BLD-CCNC: 85 IU/L (ref 40–128)
ALT SERPL-CCNC: 6 U/L (ref 10–40)
ANION GAP SERPL CALCULATED.3IONS-SCNC: 9 MMOL/L (ref 7–16)
AST SERPL-CCNC: 10 IU/L (ref 15–37)
BASOPHILS ABSOLUTE: 0.1 K/CU MM
BASOPHILS RELATIVE PERCENT: 0.7 % (ref 0–1)
BILIRUB SERPL-MCNC: 0.3 MG/DL (ref 0–1)
BUN SERPL-MCNC: 12 MG/DL (ref 6–23)
CALCIUM SERPL-MCNC: 8.7 MG/DL (ref 8.3–10.6)
CHLORIDE BLD-SCNC: 109 MMOL/L (ref 99–110)
CO2: 19 MMOL/L (ref 21–32)
CREAT SERPL-MCNC: 1.1 MG/DL (ref 0.6–1.1)
DIFFERENTIAL TYPE: ABNORMAL
EOSINOPHILS ABSOLUTE: 0.5 K/CU MM
EOSINOPHILS RELATIVE PERCENT: 6.4 % (ref 0–3)
GFR SERPL CREATININE-BSD FRML MDRD: 53 ML/MIN/1.73M2
GLUCOSE SERPL-MCNC: 108 MG/DL (ref 70–99)
HCT VFR BLD CALC: 32.7 % (ref 37–47)
HEMOGLOBIN: 10.5 GM/DL (ref 12.5–16)
IMMATURE NEUTROPHIL %: 0.4 % (ref 0–0.43)
LYMPHOCYTES ABSOLUTE: 1.5 K/CU MM
LYMPHOCYTES RELATIVE PERCENT: 20.1 % (ref 24–44)
MAGNESIUM: 1.9 MG/DL (ref 1.8–2.4)
MCH RBC QN AUTO: 31.2 PG (ref 27–31)
MCHC RBC AUTO-ENTMCNC: 32.1 % (ref 32–36)
MCV RBC AUTO: 97 FL (ref 78–100)
MONOCYTES ABSOLUTE: 0.7 K/CU MM
MONOCYTES RELATIVE PERCENT: 9.6 % (ref 0–4)
NUCLEATED RBC %: 0 %
PDW BLD-RTO: 13.7 % (ref 11.7–14.9)
PLATELET # BLD: 227 K/CU MM (ref 140–440)
PMV BLD AUTO: 10.7 FL (ref 7.5–11.1)
POTASSIUM SERPL-SCNC: 4.1 MMOL/L (ref 3.5–5.1)
RBC # BLD: 3.37 M/CU MM (ref 4.2–5.4)
SEGMENTED NEUTROPHILS ABSOLUTE COUNT: 4.6 K/CU MM
SEGMENTED NEUTROPHILS RELATIVE PERCENT: 62.8 % (ref 36–66)
SODIUM BLD-SCNC: 137 MMOL/L (ref 135–145)
TOTAL IMMATURE NEUTOROPHIL: 0.03 K/CU MM
TOTAL NUCLEATED RBC: 0 K/CU MM
TOTAL PROTEIN: 5.7 GM/DL (ref 6.4–8.2)
WBC # BLD: 7.4 K/CU MM (ref 4–10.5)

## 2024-03-12 PROCEDURE — 2580000003 HC RX 258: Performed by: STUDENT IN AN ORGANIZED HEALTH CARE EDUCATION/TRAINING PROGRAM

## 2024-03-12 PROCEDURE — 6370000000 HC RX 637 (ALT 250 FOR IP): Performed by: STUDENT IN AN ORGANIZED HEALTH CARE EDUCATION/TRAINING PROGRAM

## 2024-03-12 PROCEDURE — 6370000000 HC RX 637 (ALT 250 FOR IP): Performed by: INTERNAL MEDICINE

## 2024-03-12 PROCEDURE — 6370000000 HC RX 637 (ALT 250 FOR IP): Performed by: NURSE PRACTITIONER

## 2024-03-12 PROCEDURE — 97535 SELF CARE MNGMENT TRAINING: CPT

## 2024-03-12 PROCEDURE — 94761 N-INVAS EAR/PLS OXIMETRY MLT: CPT

## 2024-03-12 PROCEDURE — 99231 SBSQ HOSP IP/OBS SF/LOW 25: CPT | Performed by: PHYSICIAN ASSISTANT

## 2024-03-12 PROCEDURE — 97530 THERAPEUTIC ACTIVITIES: CPT

## 2024-03-12 PROCEDURE — 1200000000 HC SEMI PRIVATE

## 2024-03-12 PROCEDURE — 80053 COMPREHEN METABOLIC PANEL: CPT

## 2024-03-12 PROCEDURE — 97110 THERAPEUTIC EXERCISES: CPT

## 2024-03-12 PROCEDURE — 36415 COLL VENOUS BLD VENIPUNCTURE: CPT

## 2024-03-12 PROCEDURE — 85025 COMPLETE CBC W/AUTO DIFF WBC: CPT

## 2024-03-12 PROCEDURE — 83735 ASSAY OF MAGNESIUM: CPT

## 2024-03-12 RX ADMIN — PANTOPRAZOLE SODIUM 40 MG: 40 TABLET, DELAYED RELEASE ORAL at 06:24

## 2024-03-12 RX ADMIN — FLUOXETINE HYDROCHLORIDE 40 MG: 20 CAPSULE ORAL at 09:36

## 2024-03-12 RX ADMIN — OXYBUTYNIN CHLORIDE 5 MG: 5 TABLET ORAL at 20:25

## 2024-03-12 RX ADMIN — OXYBUTYNIN CHLORIDE 5 MG: 5 TABLET ORAL at 14:25

## 2024-03-12 RX ADMIN — ATORVASTATIN CALCIUM 40 MG: 40 TABLET, FILM COATED ORAL at 09:36

## 2024-03-12 RX ADMIN — SODIUM CHLORIDE, PRESERVATIVE FREE 10 ML: 5 INJECTION INTRAVENOUS at 20:26

## 2024-03-12 RX ADMIN — TOPIRAMATE 50 MG: 25 TABLET, FILM COATED ORAL at 09:37

## 2024-03-12 RX ADMIN — SODIUM CHLORIDE, PRESERVATIVE FREE 10 ML: 5 INJECTION INTRAVENOUS at 09:38

## 2024-03-12 RX ADMIN — Medication 2000 UNITS: at 09:36

## 2024-03-12 RX ADMIN — Medication 1000 MCG: at 09:37

## 2024-03-12 RX ADMIN — Medication 3 MG: at 20:25

## 2024-03-12 RX ADMIN — RIVAROXABAN 20 MG: 20 TABLET, FILM COATED ORAL at 18:09

## 2024-03-12 RX ADMIN — TOPIRAMATE 50 MG: 25 TABLET, FILM COATED ORAL at 20:25

## 2024-03-12 RX ADMIN — OXYBUTYNIN CHLORIDE 5 MG: 5 TABLET ORAL at 09:37

## 2024-03-12 RX ADMIN — FOLIC ACID 1 MG: 1 TABLET ORAL at 09:36

## 2024-03-12 NOTE — PROGRESS NOTES
Physical Therapy    Physical Therapy Treatment Note  Name: Yulisa Cesar MRN: [id number] :   1950   Date:  3/12/2024   Admission Date: 3/9/2024 Room:  [facility]   Discharge recommendation: SNF     Restrictions/Precautions:  general precautions, fall risk     Communication with other providers:  RN reports pt is appropriate to participate with PT    Subjective:  Patient states:  \"how did you sleep last night?\"  Pain:   Location, Type, Intensity (0/10 to 10/10):  pt denied having any pain     Objective:    Observation:  pt found resting in bed with sister at bedside upon arrival, pleasant and agreeable to participate with PT   Objective Measures:  on room air     Treatment, including education/measures:  Supine therapeutic exercises:  -Ankle pumps x30 each   -Glute sets x15 with 3\" hold   -Quad sets x15 each with 3\" hold   -Heel slides x15 each   -SAQ x15 each   -Hip abd/add x15 each     Supine to sit: SBA using bed rail with HOB elevated. Scooted to EOB with SBA.     Sit to stand/stand to sit: CGA-SBA using FWW x2 reps from bed. Cues provided for hand placement.     Sit balance: good, SBA. Pt was able to thread Depends through BLE. Required increased time.    Stand balance: good-, using no AD (FWW directly in front for additional support), CGA-SBA. Pt was able to don Depends over buttocks.     Functional transfers: CGA-SBA using FWW    Gait: 180ft using FWW with CGA-SBA. Gait speed is very slow with reduced step length, R<L and reduced foot clearance. Cues provided for erect posture and upward gaze with fair carryover. No noted instability, LOB, knee buckling, or significant gait deviations.     Education: pursed lip breathing, energy conversation     Safety: gait belt used, pt remained in recliner chair at end of session, chair alarm intact, call light/phone placed in reach, sister remained present, all needs met.     Assessment / Impression:    Pt progressing well with PT. Was able to increase

## 2024-03-12 NOTE — PROGRESS NOTES
Value Date    WBC 7.4 03/12/2024    RBC 3.37 (L) 03/12/2024    HGB 10.5 (L) 03/12/2024    HCT 32.7 (L) 03/12/2024    MCV 97.0 03/12/2024    MCH 31.2 (H) 03/12/2024    MCHC 32.1 03/12/2024    RDW 13.7 03/12/2024     03/12/2024    MPV 10.7 03/12/2024    SEGSPCT 62.8 03/12/2024    EOSRELPCT 6.4 (H) 03/12/2024    BASOPCT 0.7 03/12/2024    LYMPHOPCT 20.1 (L) 03/12/2024    MONOPCT 9.6 (H) 03/12/2024    SEGSABS 4.6 03/12/2024    EOSABS 0.5 03/12/2024    BASOSABS 0.1 03/12/2024    LYMPHSABS 1.5 03/12/2024    MONOSABS 0.7 03/12/2024    DIFFTYPE AUTOMATED DIFFERENTIAL 03/12/2024    ANISOCYTOSIS 1+ 09/14/2015     Lab Results   Component Value Date    ESR 35 (H) 03/10/2024     Chemistry:  Lab Results   Component Value Date     03/12/2024    K 4.1 03/12/2024     03/12/2024    CO2 19 (L) 03/12/2024    BUN 12 03/12/2024    CREATININE 1.1 03/12/2024    GLUCOSE 108 (H) 03/12/2024    CALCIUM 8.7 03/12/2024    PROT 5.7 (L) 03/12/2024    LABALBU 2.9 (L) 03/12/2024    BILITOT 0.3 03/12/2024    ALKPHOS 85 03/12/2024    AST 10 (L) 03/12/2024    ALT 6 (L) 03/12/2024    LABGLOM 53 (L) 03/12/2024    MG 1.9 03/12/2024     No results found for: \"MMA\", \"LDH\", \"HOMOCYSTEINE\"  No results found for: \"LD\"  Lab Results   Component Value Date    TSHHS 1.630 03/09/2024     Immunology:  Lab Results   Component Value Date    PROT 5.7 (L) 03/12/2024     No results found for: \"KAPPAUVOL\", \"LAMBDAUVOL\", \"KLFLCR\"  No results found for: \"B2M\"  Coagulation Panel:  Lab Results   Component Value Date    PROTIME 18.5 (H) 03/09/2024    INR 1.5 03/09/2024     Anemia Panel:  Lab Results   Component Value Date    LNDFWBFH75 304.3 03/09/2024    FOLATE 2.0 (L) 03/09/2024     Tumor Markers:  No results found for: \"\", \"CEA\", \"\", \"LABCA2\", \"PSA\"     Observations:  No data recorded     Assessment:  Mediastinal lymphadenopathy and right apical lung nodularity    Plan:  I reviewed her CT scan images. She has indeterminate mediastinal  lymphadenopathy and RUL nodularity. She has weight loss and night sweat.     I will request LDH level today. I agree with radiologist and I will plan for PET/CT scan as an outpatient. If she is found to have FDG avid lung nodule and lymphadenopathy, I will consider tissue biopsy.     Normocytic normochromic anemia - Will request iron study, reticulocyte count and haptoglobin for her anemia. These were ordered and not drawn, reordered.    Low folate level, daily folate supplementation added.. Her B12 level, TSH and bilirubin were normal.     Please continue other current management as per primary team. Awaiting SNF placement for discharge.    Patient was seen independently with attending physician Dr Jose ga for consultation.    Rosa Cain PA-C    We will continue to follow, please call with any questions.

## 2024-03-12 NOTE — CONSULTS
RENAL DOSE ADJUSTMENT MADE PER P/T PROTOCOL    PREVIOUS ORDER:  Xarelto 15 mg daily    Estimated Creatinine Clearance: 52 mL/min (based on SCr of 1.1 mg/dL).  Recent Labs     03/10/24  1136 03/11/24  0144 03/12/24  0608   BUN 13 13 12   CREATININE 1.2* 1.3* 1.1    209 227     NEW RENALLY ADJUSTED ORDER:  Xarelto 20 mg daily (Home dose - Hx of DVT/PE)     Ariel Esqueda [facility]  3/12/2024 10:26 AM

## 2024-03-12 NOTE — PROGRESS NOTES
Occupational Therapy    Occupational Therapy Treatment Note    Name: Yulisa Cesar MRN: [id number] :   1950   Date:  3/12/2024   Admission Date: 3/9/2024 Room:  [address] [place] [address]-A     Primary Problem:  OLEG    Restrictions/Precautions:          General Precautions, Fall Risk       Communication with other providers: RN approves session    Subjective:  Patient states:  Pt is agreeable to therapy session.   Pain:   Location, Type, Intensity (0/10 to 10/10):      Objective:    Observation: Pt seated in chair upon arrival.   Objective Measures:  Alert and oriented. Appeared short of breath.    Treatment, including education:  Self Care Training:   Cues were given for safety, sequence, UE/LE placement, visual cues, and balance.    Activities performed today included UB/LB dressing tasks, toileting, hand hygiene at sink    Pt seated upon arrival. modA sit to stand. CGA with functional ambulation less than household distance with walker. Pt incontinent during functional ambulation. Returned to room and completed toilet transfer with modA. CGA while pericare completed. Pt Max donning socks. Pt doffed and donned gown with setup.  Pt modA sit to stand and CGA while returning to chair. Pt's sister arrived end of session.    Assessment / Impression:    Patient's tolerance of treatment: Well  Adverse Reaction: None  Significant change in status and impact: Improved from initial evaluation  Barriers to improvement: None noted    Pt would benefit from continued OT services to continue to work on ADL routine and functional transfers/mobility.    Licensed Therapist was present, directed the patient's care, made skilled judgement, and was responsible for assessment and treatment of the patient.        Jaelyn JIMENEZ/L    Plan for Next Session:    Continue per OT POC.    Time in:  153  Time out:  220  Timed treatment minutes:  27  Total treatment time:  27      Electronically signed by:    Tiff Chen,   3/12/2024, 2:26  PM

## 2024-03-12 NOTE — CARE COORDINATION
Call to Monica, [facility], to check status of referral. Per Monica, she was off yesterday and asked that referral information be faxed to her. Referral information faxed at this time.     2:57 PM Call to [facility] to check status of referral. No answer at this time, voicemail left with request for return call.     3:55 PM Received return call from Jass stating that fax not received. Different fax number provided. JOSÉ ANTONIO sent referral through [facility] and also to new fax number that was given.

## 2024-03-12 NOTE — PROGRESS NOTES
V2.0  [facility] [place]      Name:  Yulisa Cesar /Age/Sex: 1950  (73 y.o. female)   MRN & CSN:  [id number] & [id number] Encounter Date/Time: 3/12/2024 1:18 PM EDT    Location:  [address] [place] [address]-A PCP: Teodoro Oliveros MD       Hospital Day: 4    Assessment and Plan:   Yulisa Cesar is a 73 y.o. female with pmh of anemia, DVT/PE SP thrombectomy who presents with OLEG (acute kidney injury) (HCC)    Plan:  Syncope secondary to orthostatic hypotension  Frequent falls  -positive orthostasis on admission status post IV hydration.  EKG with no acute ischemia.  CT a chest negative for PE.  CT head and and C-spine nonacute   -Continue fall precautions  -Completed IV fluids  -PT OT evaluated patient and recommended SNF placement  - is working on it     Acute kidney injury, GFR 43-  -SP IV fluids   -Creatinine 1.1 with GFR 53  -Monitor BMP     History recurrent DVT\PE status post thrombectomy 2024  -on Xarelto.  (RHC with elevated PA pressure of 38 in 2024. TTE in 3/2024 with normal RV size and function, improved from 2024 (mildly enlarged RV and moderately reduced RV function).      Mediastinal lymphadenopathy, pretracheal lymph node 1.5 x 1.0 cm, right apical lung nodularity  -history of weight loss and night sweats.  Heme/Oncology consulted planning PET\CT scan as outpatient.     Chronic anemia  -hemoglobin stable.  Low folate level noted, started on folic acid per heme oncology     Vitamin D deficiency  -patient with low vitamin D level 19, started on vitamin D oral supplement    Diet ADULT DIET; Regular  ADULT ORAL NUTRITION SUPPLEMENT; Breakfast, Dinner; Standard High Calorie/High Protein Oral Supplement  ADULT ORAL NUTRITION SUPPLEMENT; Lunch; Frozen Oral Supplement   DVT Prophylaxis [] Lovenox, []  Heparin, [] SCDs, [] Ambulation,  [] Eliquis, [x] Xarelto  [] Coumadin   Code Status Full Code   Disposition From: Home  Expected Disposition: \SNF  Estimated Date of

## 2024-03-12 NOTE — PROGRESS NOTES
03/12/24 1643   Encounter Summary   Encounter Overview/Reason  Initial Encounter   Service Provided For: Patient   Referral/Consult From: [facility]   Support System Family members   Last Encounter  03/12/24  (patient rerquested prayer support)   Complexity of Encounter Low   Begin Time 1630   End Time  1647   Total Time Calculated 17 min   Spiritual/Emotional needs   Type Spiritual Support   Grief, Loss, and Adjustments   Type Adjustment to illness   Assessment/Intervention/Outcome   Assessment Coping   Intervention Active listening;Empowerment;Sustaining Presence/Ministry of presence   Outcome Coping;Encouraged;Engaged in conversation;Expressed Gratitude   Plan and Referrals   Plan/Referrals Continue Support (comment)

## 2024-03-13 PROCEDURE — 94761 N-INVAS EAR/PLS OXIMETRY MLT: CPT

## 2024-03-13 PROCEDURE — 6370000000 HC RX 637 (ALT 250 FOR IP): Performed by: STUDENT IN AN ORGANIZED HEALTH CARE EDUCATION/TRAINING PROGRAM

## 2024-03-13 PROCEDURE — 6370000000 HC RX 637 (ALT 250 FOR IP): Performed by: NURSE PRACTITIONER

## 2024-03-13 PROCEDURE — 1200000000 HC SEMI PRIVATE

## 2024-03-13 PROCEDURE — 2580000003 HC RX 258: Performed by: STUDENT IN AN ORGANIZED HEALTH CARE EDUCATION/TRAINING PROGRAM

## 2024-03-13 PROCEDURE — 6370000000 HC RX 637 (ALT 250 FOR IP): Performed by: INTERNAL MEDICINE

## 2024-03-13 PROCEDURE — 97116 GAIT TRAINING THERAPY: CPT

## 2024-03-13 RX ADMIN — FLUOXETINE HYDROCHLORIDE 40 MG: 20 CAPSULE ORAL at 09:22

## 2024-03-13 RX ADMIN — Medication 2000 UNITS: at 09:22

## 2024-03-13 RX ADMIN — OXYBUTYNIN CHLORIDE 5 MG: 5 TABLET ORAL at 09:22

## 2024-03-13 RX ADMIN — SODIUM CHLORIDE, PRESERVATIVE FREE 10 ML: 5 INJECTION INTRAVENOUS at 20:16

## 2024-03-13 RX ADMIN — PANTOPRAZOLE SODIUM 40 MG: 40 TABLET, DELAYED RELEASE ORAL at 05:38

## 2024-03-13 RX ADMIN — ATORVASTATIN CALCIUM 40 MG: 40 TABLET, FILM COATED ORAL at 09:22

## 2024-03-13 RX ADMIN — OXYBUTYNIN CHLORIDE 5 MG: 5 TABLET ORAL at 16:38

## 2024-03-13 RX ADMIN — TOPIRAMATE 50 MG: 25 TABLET, FILM COATED ORAL at 09:22

## 2024-03-13 RX ADMIN — FOLIC ACID 1 MG: 1 TABLET ORAL at 09:22

## 2024-03-13 RX ADMIN — Medication 1000 MCG: at 09:22

## 2024-03-13 RX ADMIN — RIVAROXABAN 20 MG: 20 TABLET, FILM COATED ORAL at 18:51

## 2024-03-13 RX ADMIN — TOPIRAMATE 50 MG: 25 TABLET, FILM COATED ORAL at 20:15

## 2024-03-13 RX ADMIN — OXYBUTYNIN CHLORIDE 5 MG: 5 TABLET ORAL at 20:15

## 2024-03-13 RX ADMIN — SODIUM CHLORIDE, PRESERVATIVE FREE 10 ML: 5 INJECTION INTRAVENOUS at 09:23

## 2024-03-13 NOTE — PROGRESS NOTES
Physical Therapy Treatment Note  Name: Yulisa Cesar MRN: [id number] :   1950   Date:  3/13/2024   Admission Date: 3/9/2024 Room:  [address] [place], [place] [address]   Restrictions/Precautions:general precautions, fall risk    Communication with other providers:  Pt okay to see for therapy per RN   Subjective:  Patient states:  \"I feel like I'm getting stronger, bit-by-bit\"   Pain:   Location, Type, Intensity (0/10 to 10/10):  Denies  Objective:    Observation:  Pt sitting up in recliner upon PTA arrival.   Objective Measures:  Tele, stable  Treatment, including education/measures:    Therapeutic Activity Training:   Therapeutic activity training was instructed today.  Cues were given for safety, sequence, UE/LE placement, awareness, and balance.    Activities performed today included STS.     Mobility:  STS: CGA from recliner, Min A for controlled descent.     Gait:  Pt ambulated ~90ft x 2 with RW and CGA for safety. Pt demos very slow gait speed, decreased step length, decreased stance on R LE with observed mild limping, mild postural sway, downward gaze and forward head posture. PTA provided cues for upright gaze and inquiring about pain or strength deficits causing a mild limp, pt denies both.     Pt politely declines further activities this date dt significant fatigue and PTA observed increased RR.     Safety:   Pt returned safely to recliner with chair alarm activated, call light in reach, all needs met.   Assessment / Impression:    Pt tolerated gait distances well this date but with increased fatigue by end of session.   Patient's tolerance of treatment:  Fair+   Adverse Reaction: Increased RR, fatigue.  Significant change in status and impact:  none  Barriers to improvement:  none  Plan for Next Session:    Plan to continue OOB activities.   Time in:  1601  Time out:  1614  Timed treatment minutes:  13  Total treatment time:  13    Previously filed items:  Social/Functional History  Lives With: Family  Type of Home:  House  Home Layout: One level  Home Access: Level entry  Bathroom Shower/Tub: Walk-in shower  Bathroom Toilet: Standard  Bathroom Equipment: Built-in shower seat, Grab bars in shower, Toilet raiser  Home Equipment: Cane, Rollator  Has the patient had two or more falls in the past year or any fall with injury in the past year?: Yes  ADL Assistance: Independent  Homemaking Assistance: Independent  Ambulation Assistance: Independent (w/ SPC vs rollator prn)  Transfer Assistance: Independent  Active : No             Electronically signed by:    Ngoc Leiva, PTA  3/13/2024, 6:25 PM

## 2024-03-13 NOTE — PROGRESS NOTES
V2.0  [facility] Hospitalist Progress Note      Name:  Yulisa Cesra /Age/Sex: 1950  (73 y.o. female)   MRN & CSN:  [id number] & [id number] Encounter Date/Time: 3/13/2024 2:23 PM EDT    Location:  [address] [place] [address]-A PCP: Teodoro Oliveros MD       Hospital Day: 5    Assessment and Plan:   Yulisa Cesar is a 73 y.o. female with pmh of anemia, DVT/PE SP thrombectomy who presents with OLEG (acute kidney injury) (HCC).  Patient is medically stable to be discharged.  She is accepted by [facility].  Pending pre-CERT.    Plan:  Syncope secondary to orthostatic hypotension  Frequent falls  -positive orthostasis on admission status post IV hydration.  EKG with no acute ischemia.  CT a chest negative for PE.  CT head and and C-spine nonacute   -Continue fall precautions  -Completed IV fluids  -PT OT evaluated patient and recommended SNF placement  - received message from SNF: Patient is accepted, pending pre-CERT     Acute kidney injury, GFR 43-  -SP IV fluids   -Creatinine 1.1 with GFR 53  -Monitor BMP     History recurrent DVT\PE status post thrombectomy 2024  -on Xarelto.  (RHC with elevated PA pressure of 38 in 2024. TTE in 3/2024 with normal RV size and function, improved from 2024 (mildly enlarged RV and moderately reduced RV function).      Mediastinal lymphadenopathy, pretracheal lymph node 1.5 x 1.0 cm, right apical lung nodularity  -history of weight loss and night sweats.  Heme/Oncology consulted planning PET\CT scan as outpatient.     Chronic anemia  -hemoglobin stable.  Low folate level noted, started on folic acid per heme oncology     Vitamin D deficiency  -patient with low vitamin D level 19, started on vitamin D oral supplement       Diet ADULT DIET; Regular  ADULT ORAL NUTRITION SUPPLEMENT; Breakfast, Dinner; Standard High Calorie/High Protein Oral Supplement  ADULT ORAL NUTRITION SUPPLEMENT; Lunch; Frozen Oral Supplement   DVT Prophylaxis [x] Lovenox, []  Heparin, [] SCDs,    No results found for this or any previous visit (from the past 24 hour(s)).     Imaging/Diagnostics Last 24 Hours   No results found.    Electronically signed by CELIA Ho CNP on 3/13/2024 at 2:23 PM

## 2024-03-13 NOTE — CARE COORDINATION
Received voicemail from Jass and Monica at [facility] that neither had received any of the multiple faxed referrals. Referral information refaxed at this time.     8:20 AM Rec'd call from Jass who confirmed that she received referral. Jass also stated that patient has been accepted and she will initiate precert. Jass to update this CM once available.

## 2024-03-14 PROCEDURE — 99231 SBSQ HOSP IP/OBS SF/LOW 25: CPT | Performed by: PHYSICIAN ASSISTANT

## 2024-03-14 PROCEDURE — 6370000000 HC RX 637 (ALT 250 FOR IP): Performed by: INTERNAL MEDICINE

## 2024-03-14 PROCEDURE — 94761 N-INVAS EAR/PLS OXIMETRY MLT: CPT

## 2024-03-14 PROCEDURE — 6370000000 HC RX 637 (ALT 250 FOR IP): Performed by: NURSE PRACTITIONER

## 2024-03-14 PROCEDURE — 6370000000 HC RX 637 (ALT 250 FOR IP): Performed by: STUDENT IN AN ORGANIZED HEALTH CARE EDUCATION/TRAINING PROGRAM

## 2024-03-14 PROCEDURE — 1200000000 HC SEMI PRIVATE

## 2024-03-14 PROCEDURE — 2580000003 HC RX 258: Performed by: STUDENT IN AN ORGANIZED HEALTH CARE EDUCATION/TRAINING PROGRAM

## 2024-03-14 RX ADMIN — FOLIC ACID 1 MG: 1 TABLET ORAL at 10:38

## 2024-03-14 RX ADMIN — SODIUM CHLORIDE, PRESERVATIVE FREE 10 ML: 5 INJECTION INTRAVENOUS at 10:39

## 2024-03-14 RX ADMIN — SODIUM CHLORIDE, PRESERVATIVE FREE 10 ML: 5 INJECTION INTRAVENOUS at 20:17

## 2024-03-14 RX ADMIN — FLUOXETINE HYDROCHLORIDE 40 MG: 20 CAPSULE ORAL at 10:38

## 2024-03-14 RX ADMIN — PANTOPRAZOLE SODIUM 40 MG: 40 TABLET, DELAYED RELEASE ORAL at 06:13

## 2024-03-14 RX ADMIN — ATORVASTATIN CALCIUM 40 MG: 40 TABLET, FILM COATED ORAL at 10:38

## 2024-03-14 RX ADMIN — OXYBUTYNIN CHLORIDE 5 MG: 5 TABLET ORAL at 20:16

## 2024-03-14 RX ADMIN — OXYBUTYNIN CHLORIDE 5 MG: 5 TABLET ORAL at 10:38

## 2024-03-14 RX ADMIN — OXYBUTYNIN CHLORIDE 5 MG: 5 TABLET ORAL at 15:28

## 2024-03-14 RX ADMIN — TOPIRAMATE 50 MG: 25 TABLET, FILM COATED ORAL at 20:16

## 2024-03-14 RX ADMIN — Medication 1000 MCG: at 10:38

## 2024-03-14 RX ADMIN — Medication 2000 UNITS: at 10:38

## 2024-03-14 RX ADMIN — TOPIRAMATE 50 MG: 25 TABLET, FILM COATED ORAL at 10:38

## 2024-03-14 RX ADMIN — RIVAROXABAN 20 MG: 20 TABLET, FILM COATED ORAL at 18:08

## 2024-03-14 NOTE — PROGRESS NOTES
Physical Therapy    Upon arrival, pt found sitting up in recliner chair. Pt politely declined to participate with PT reporting that she is quite fatigued today. PT will re-attempt at a later time.

## 2024-03-14 NOTE — CARE COORDINATION
Call to Monica, [facility], to check status of precert. Voicemail left with request for return call.     11:53 AM Received return call from Jass, stating that precert remains pending at this time.

## 2024-03-14 NOTE — PROGRESS NOTES
Patient Name:  Yulisa Cesar  Patient :  1950  Patient MRN:  [id number]     Primary Oncologist: Micheal Garcia MD  Referring Provider: Teodoro Oliveros MD     Date of Service: 3/8/2024      Reason for Consult: To evaluate the patient with mediastinal lymphadenopathy and right apical lung nodularity.      Chief Complaint:  Fall at home.     Patient Active Problem List:     Pulmonary nodule     DVT, recurrent, lower extremity, acute (HCC)     Dysphagia     Abdominal pain     Anticoagulated on Coumadin     Hx-TIA (transient ischemic attack)     Overactive bladder     OLEG (acute kidney injury) (HCC)    HPI:   Yulisa Cesar is a 73 y.o. female with medical history significant for history of CVA, recurrent DVT/PE, s/p thrombectomy in 2024, GERD and IBS presented to the [place] ED on 3/9/24 with fall.     Endorsed having a fall at home that was not preceded by dizziness or visual changes. Hit head with fall. No known seizure-activity or proceeding chest pain. After getting up, had left-sided chest pain. Endorsed generalized weakness and repeat falls at home recently. Has walker and cane at home. Denied any emesis or decreased oral intake. Denied any fevers, chills, SOB, cough, abdominal pain, C/D or urinary changes.    CTA chest on admisstion, 3/8/24 showed no evidence of PE. Mediastinal lymphadenopathy, indeterminate. Pulmonary nodularity or nodular scarring particularly of the right apex. This is indeterminate.      Since she was found to have abnormal CT scans, I was called to evaluate her.     She has lost about 15 lbs over last 3 months. Also endorsed drenching night sweat often. Denies unexplained fever.     Interval History    3/14/2024    No family at bedside today.  Still tired today. No new complaints.  Understands plan. Awaiting placement.     Previously reviewed plan with her and daughter which they verbalize understanding.     Labs from 3/12 with Cr 1.1, Ca 8.7, Hgb 10.5, Plt 227k, WBC

## 2024-03-14 NOTE — PLAN OF CARE
Problem: Discharge Planning  Goal: Discharge to home or other facility with appropriate resources  Outcome: Progressing     Problem: Safety - Adult  Goal: Free from fall injury  Outcome: Progressing     Problem: Skin/Tissue Integrity  Goal: Absence of new skin breakdown  Description: 1.  Monitor for areas of redness and/or skin breakdown  2.  Assess vascular access sites hourly  3.  Every 4-6 hours minimum:  Change oxygen saturation probe site  4.  Every 4-6 hours:  If on nasal continuous positive airway pressure, respiratory therapy assess nares and determine need for appliance change or resting period.  Outcome: Progressing     Problem: Pain  Goal: Verbalizes/displays adequate comfort level or baseline comfort level  Outcome: Progressing     Problem: Chronic Conditions and Co-morbidities  Goal: Patient's chronic conditions and co-morbidity symptoms are monitored and maintained or improved  Outcome: Progressing     Problem: Nutrition Deficit:  Goal: Optimize nutritional status  Outcome: Progressing

## 2024-03-14 NOTE — PROGRESS NOTES
V2.0  [facility] [place]ist Progress Note      Name:  Yulisa Cesar /Age/Sex: 1950  (73 y.o. female)   MRN & CSN:  [id number] & [id number] Encounter Date/Time: 3/14/2024 1:14 PM EDT    Location:  [address] [place] [address]-A PCP: Teodoro Oliveros MD       Hospital Day: 6    Assessment and Plan:   Yulisa Cesar is a 73 y.o. female with pmh of anemia, DVT/PE SP thrombectomy who presents with OLEG (acute kidney injury) (HCC).  Patient is medically stable to be discharged.  She is accepted by [facility].  Still pending pre-CERT.  No complaint, no documentation change.    Plan:  Syncope secondary to orthostatic hypotension  Frequent falls  -positive orthostasis on admission status post IV hydration.  EKG with no acute ischemia.  CT a chest negative for PE.  CT head and and C-spine nonacute   -Continue fall precautions  -Completed IV fluids  -PT OT evaluated patient and recommended SNF placement  - received message from SNF: Patient is accepted, pending pre-CERT     Acute kidney injury, GFR 43-  -SP IV fluids   -Creatinine 1.1 with GFR 53  -Monitor BMP     History recurrent DVT\PE status post thrombectomy 2024  -on Xarelto.  (RHC with elevated PA pressure of 38 in 2024. TTE in 3/2024 with normal RV size and function, improved from 2024 (mildly enlarged RV and moderately reduced RV function).      Mediastinal lymphadenopathy, pretracheal lymph node 1.5 x 1.0 cm, right apical lung nodularity  -history of weight loss and night sweats.  Heme/Oncology consulted planning PET\CT scan as outpatient.     Chronic anemia  -hemoglobin stable.  Low folate level noted, started on folic acid per heme oncology     Vitamin D deficiency  -patient with low vitamin D level 19, started on vitamin D oral supplement       Diet ADULT DIET; Regular  ADULT ORAL NUTRITION SUPPLEMENT; Breakfast, Dinner; Standard High Calorie/High Protein Oral Supplement  ADULT ORAL NUTRITION SUPPLEMENT; Lunch; Frozen Oral Supplement   DVT

## 2024-03-14 NOTE — PLAN OF CARE
Problem: Discharge Planning  Goal: Discharge to home or other facility with appropriate resources  3/14/2024 1026 by Loraine Richards LPN  Outcome: Progressing  3/13/2024 2221 by Amena Dias RN  Outcome: Progressing     Problem: Safety - Adult  Goal: Free from fall injury  3/14/2024 1026 by Loraine Richards LPN  Outcome: Progressing  3/13/2024 2221 by Amena Dias RN  Outcome: Progressing     Problem: Skin/Tissue Integrity  Goal: Absence of new skin breakdown  Description: 1.  Monitor for areas of redness and/or skin breakdown  2.  Assess vascular access sites hourly  3.  Every 4-6 hours minimum:  Change oxygen saturation probe site  4.  Every 4-6 hours:  If on nasal continuous positive airway pressure, respiratory therapy assess nares and determine need for appliance change or resting period.  3/14/2024 1026 by Loraine Richards LPN  Outcome: Progressing  3/13/2024 2221 by Amena Dias RN  Outcome: Progressing     Problem: Pain  Goal: Verbalizes/displays adequate comfort level or baseline comfort level  3/14/2024 1026 by Loraine Richards LPN  Outcome: Progressing  3/13/2024 2221 by Amena Dias RN  Outcome: Progressing     Problem: Chronic Conditions and Co-morbidities  Goal: Patient's chronic conditions and co-morbidity symptoms are monitored and maintained or improved  3/14/2024 1026 by Loraine Richards LPN  Outcome: Progressing  3/13/2024 2221 by Amena Dias RN  Outcome: Progressing     Problem: Nutrition Deficit:  Goal: Optimize nutritional status  3/14/2024 1026 by Loraine Richards LPN  Outcome: Progressing  3/13/2024 2221 by Amena Dias RN  Outcome: Progressing

## 2024-03-15 VITALS
DIASTOLIC BLOOD PRESSURE: 57 MMHG | OXYGEN SATURATION: 96 % | TEMPERATURE: 97.6 F | BODY MASS INDEX: 29.55 KG/M2 | WEIGHT: 195 LBS | HEIGHT: 68 IN | RESPIRATION RATE: 18 BRPM | HEART RATE: 71 BPM | SYSTOLIC BLOOD PRESSURE: 116 MMHG

## 2024-03-15 PROCEDURE — 6370000000 HC RX 637 (ALT 250 FOR IP): Performed by: INTERNAL MEDICINE

## 2024-03-15 PROCEDURE — 6370000000 HC RX 637 (ALT 250 FOR IP): Performed by: STUDENT IN AN ORGANIZED HEALTH CARE EDUCATION/TRAINING PROGRAM

## 2024-03-15 PROCEDURE — 2580000003 HC RX 258: Performed by: STUDENT IN AN ORGANIZED HEALTH CARE EDUCATION/TRAINING PROGRAM

## 2024-03-15 PROCEDURE — 6370000000 HC RX 637 (ALT 250 FOR IP): Performed by: NURSE PRACTITIONER

## 2024-03-15 RX ADMIN — PANTOPRAZOLE SODIUM 40 MG: 40 TABLET, DELAYED RELEASE ORAL at 05:15

## 2024-03-15 RX ADMIN — FLUOXETINE HYDROCHLORIDE 40 MG: 20 CAPSULE ORAL at 09:42

## 2024-03-15 RX ADMIN — Medication 2000 UNITS: at 09:43

## 2024-03-15 RX ADMIN — ATORVASTATIN CALCIUM 40 MG: 40 TABLET, FILM COATED ORAL at 09:43

## 2024-03-15 RX ADMIN — FOLIC ACID 1 MG: 1 TABLET ORAL at 09:43

## 2024-03-15 RX ADMIN — OXYBUTYNIN CHLORIDE 5 MG: 5 TABLET ORAL at 09:43

## 2024-03-15 RX ADMIN — Medication 1000 MCG: at 09:43

## 2024-03-15 RX ADMIN — TOPIRAMATE 50 MG: 25 TABLET, FILM COATED ORAL at 09:43

## 2024-03-15 RX ADMIN — SODIUM CHLORIDE, PRESERVATIVE FREE 10 ML: 5 INJECTION INTRAVENOUS at 09:45

## 2024-03-15 NOTE — CARE COORDINATION
Received call from Jass who stated that patient has been denied by insurance and peer to peer is needed. JOSÉ ANTONIO updated [employer] with information for peer to peer.    [phone] option 2, to be completed today by 3:30pm.    11:36 AM Received update from [employer] that patient has been approved. Call to Jass with update. Jass checked system and stated that patient shows as pending. Jass to call this CM once available.     12:47 PM Received return call from Jass that portal now shows patient approved.     1:06 PM Transport arranged with [facility] for 2pm pickup. JOSÉ ANTONIO updated patient, primary RN Gisele and Jass with transport time.

## 2024-03-15 NOTE — PROGRESS NOTES
Comprehensive Nutrition Assessment    Type and Reason for Visit:  Reassess    Nutrition Recommendations/Plan:   Continue regular diet   Modify frequency of oral nutrition supplements      Malnutrition Assessment:  Malnutrition Status:  At risk for malnutrition (Comment) (03/11/24 1610)    Context:  Acute Illness       Nutrition Assessment:    Pt on regular diet, skipped breakfast, wants to go home, didn't feel like eating. However, reports eating >50% of most meals on other days. Pt likes chocolate standard supplements in evening. Follow on moderate nutrition risk.    Nutrition Related Findings:    Vitamin D, Vitamin B 12, Folvite Wound Type:  (biopsy site)       Current Nutrition Intake & Therapies:    Average Meal Intake: 0%, 1-25%, %  Average Supplements Intake: % (per pt sometimes)  ADULT DIET; Regular  ADULT ORAL NUTRITION SUPPLEMENT; Breakfast, Dinner; Standard High Calorie/High Protein Oral Supplement  ADULT ORAL NUTRITION SUPPLEMENT; Lunch; Frozen Oral Supplement    Anthropometric Measures:  Height: 172.7 cm (5' 8\")  Ideal Body Weight (IBW): 140 lbs (64 kg)    Admission Body Weight: 88.5 kg (195 lb 1.7 oz)  Current Body Weight: 87.7 kg (193 lb 5.5 oz),   IBW.    Current BMI (kg/m2): 29.4  Usual Body Weight:  (200 lb in 2017)                       BMI Categories: Overweight (BMI 25.0-29.9)    Estimated Daily Nutrient Needs:  Energy Requirements Based On: Kcal/kg  Weight Used for Energy Requirements: Ideal  Energy (kcal/day): 6569-3172 (25-30 kcal/kg IBW)  Weight Used for Protein Requirements: Ideal  Protein (g/day): 64-77 (1-1.2 g/kg IBW)  Method Used for Fluid Requirements: 1 ml/kcal  Fluid (ml/day): 9242-6695    Nutrition Diagnosis:   Inadequate oral intake related to acute injury/trauma as evidenced by poor intake prior to admission, weight loss    Nutrition Interventions:   Food and/or Nutrient Delivery: Continue Current Diet, Modify Oral Nutrition Supplement  Nutrition Education/Counseling:

## 2024-03-15 NOTE — DISCHARGE INSTR - COC
shifts:  In: 240 [P.O.:240]  Out: -     Safety Concerns:     {[facility] Safety Concerns:318395742}    Impairments/Disabilities:      {[facility] Impairments/Disabilities:266586354}      Patient's personal belongings (please select all that are sent with patient):  {P DME Belongings:196952329}    RN SIGNATURE:  {Esignature:336151746}    CASE MANAGEMENT/SOCIAL WORK SECTION    Inpatient Status Date: ***    Readmission Risk Assessment Score:  Readmission Risk              Risk of Unplanned Readmission:  10           Discharging to Facility/ Agency   Name:   Address:  Phone:  Fax:    Dialysis Facility (if applicable)   Name:  Address:  Dialysis Schedule:  Phone:  Fax:    / signature: {Esignature:345984349}    PHYSICIAN SECTION    Nutrition Therapy:  Current Nutrition Therapy:   {[facility] Diet List:450803120}    Routes of Feeding: {P DME Other Feedings:528308162}  Liquids: {Slp liquid thickness:94566}  Daily Fluid Restriction: {[facility] DME Yes amt example:393726819}  Last Modified Barium Swallow with Video (Video Swallowing Test): {Done Not Done Date:313839607}    Treatments at the Time of Hospital Discharge:   Respiratory Treatments: ***  Oxygen Therapy:  {Therapy; copd oxygen:04049}  Ventilator:    {[facility] Vent List:008166154}    Rehab Therapies: {THERAPEUTIC INTERVENTION:6383566063}  Weight Bearing Status/Restrictions: {[facility] Weight Bearin}  Other Medical Equipment (for information only, NOT a DME order):  {EQUIPMENT:783233517}  Other Treatments: ***    Prognosis: {Prognosis:5021421185}    Condition at Discharge: { Patient Condition:586997430}    Rehab Potential (if transferring to Rehab): {Prognosis:4812850046}    Recommended Labs or Other Treatments After Discharge: ***    Physician Certification: I certify the above information and transfer of Yulisa Cesar  is necessary for the continuing treatment of the diagnosis listed and that she requires {Admit to Appropriate Level of Care:70164}  for {GREATER/LESS:088574000} 30 days.     Update Admission H&P: {[facility] DME Changes in HandP:232832088}    PHYSICIAN SIGNATURE:  {Esignature:596598205}

## 2024-03-15 NOTE — DISCHARGE SUMMARY
to skull base. Axial images and multiplanar reformatted images reviewed.   Up-to-date CT equipment and radiation dose reduction techniques were employed. IV Contrast: None.  FINDINGS: INTRACRANIAL HEMORRHAGE: None. VENTRICLES: The ventricles are enlarged, however, symmetric. The remaining midline. BRAIN PARENCHYMA: Periventricular and subcortical hypoattenuation is noted. Remote infarct right frontal lobe. Gray-white matter differentiation is normal. No intracranial mass effect. SKULL: No destructive osseous process or fracture. PARANASAL SINUSES AND MASTOIDS: No acute sinusitis or mastoiditis. ORBITS: Normal. EXTRACRANIAL SOFT TISSUES: Normal.     No acute intracranial abnormality. Age-related atrophy with sequelae of microvascular ischemic change. Electronically signed by Bernardo Sandoval MD      CBC: No results for input(s): \"WBC\", \"HGB\", \"PLT\" in the last 72 hours.  BMP:  No results for input(s): \"NA\", \"K\", \"CL\", \"CO2\", \"BUN\", \"CREATININE\", \"GLUCOSE\" in the last 72 hours.  Hepatic: No results for input(s): \"AST\", \"ALT\", \"ALB\", \"BILITOT\", \"ALKPHOS\" in the last 72 hours.  Lipids: No results found for: \"CHOL\", \"HDL\", \"TRIG\"  Hemoglobin A1C: No results found for: \"LABA1C\"  TSH: No results found for: \"TSH\"  Troponin:   Lab Results   Component Value Date/Time    TROPONINT < 0.010 09/14/2015 03:12 PM     Lactic Acid: No results for input(s): \"LACTA\" in the last 72 hours.  BNP: No results for input(s): \"PROBNP\" in the last 72 hours.  UA:No results found for: \"NITRU\", \"COLORU\", \"PHUR\", \"LABCAST\", \"WBCUA\", \"RBCUA\", \"MUCUS\", \"TRICHOMONAS\", \"YEAST\", \"BACTERIA\", \"CLARITYU\", \"SPECGRAV\", \"LEUKOCYTESUR\", \"UROBILINOGEN\", \"BILIRUBINUR\", \"BLOODU\", \"GLUCOSEU\", \"KETUA\", \"AMORPHOUS\"  Urine Cultures: No results found for: \"LABURIN\"  Blood Cultures: No results found for: \"BC\"  No results found for: \"BLOODCULT2\"  Organism: No results found for: \"ORG\"    Time Spent Discharging patient 35  minutes    Electronically signed by CELIA Ho  - CNP on 3/15/2024 at 1:05 PM

## 2024-04-01 ENCOUNTER — TELEPHONE (OUTPATIENT)
Dept: ONCOLOGY | Age: 74
End: 2024-04-01

## 2024-04-09 DIAGNOSIS — R91.1 LUNG NODULE: Primary | ICD-10-CM

## 2024-04-15 ENCOUNTER — TELEPHONE (OUTPATIENT)
Dept: ONCOLOGY | Age: 74
End: 2024-04-15

## 2024-04-15 NOTE — TELEPHONE ENCOUNTER
Spoke with Bernarda given time and prep for PET scan to be done on 4/22/24 [facility] arrival at 10 AM.

## 2024-04-16 ENCOUNTER — TELEPHONE (OUTPATIENT)
Dept: ONCOLOGY | Age: 74
End: 2024-04-16

## 2024-04-22 ENCOUNTER — TELEPHONE (OUTPATIENT)
Dept: ONCOLOGY | Age: 74
End: 2024-04-22

## 2024-04-22 NOTE — TELEPHONE ENCOUNTER
4/22/24 - pt's sister and ANDREW Cooney called to cx the 4/30/24 [facility] appt. States her sister is weak and refuses to have the Pet scan on 4/22/24 and wants to cx the [facility] appt.

## 2024-09-27 ENCOUNTER — APPOINTMENT (OUTPATIENT)
Dept: GENERAL RADIOLOGY | Facility: HOSPITAL | Age: 74
End: 2024-09-27
Payer: MEDICARE

## 2024-09-27 ENCOUNTER — APPOINTMENT (OUTPATIENT)
Dept: CT IMAGING | Facility: HOSPITAL | Age: 74
End: 2024-09-27
Payer: MEDICARE

## 2024-09-27 ENCOUNTER — HOSPITAL ENCOUNTER (EMERGENCY)
Facility: HOSPITAL | Age: 74
Discharge: HOME | End: 2024-09-27
Attending: EMERGENCY MEDICINE
Payer: MEDICARE

## 2024-09-27 VITALS
DIASTOLIC BLOOD PRESSURE: 72 MMHG | OXYGEN SATURATION: 91 % | SYSTOLIC BLOOD PRESSURE: 133 MMHG | TEMPERATURE: 98.5 F | RESPIRATION RATE: 16 BRPM | HEART RATE: 63 BPM

## 2024-09-27 DIAGNOSIS — S09.90XA INJURY OF HEAD, INITIAL ENCOUNTER: ICD-10-CM
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
## 2024-09-27 DIAGNOSIS — N18.9 CHRONIC KIDNEY DISEASE, UNSPECIFIED CKD STAGE: ICD-10-CM
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
## 2024-09-27 DIAGNOSIS — Z79.01 CHRONIC ANTICOAGULATION: ICD-10-CM

## 2024-09-27 DIAGNOSIS — N39.0 ACUTE UTI: ICD-10-CM

## 2024-09-27 LAB
ALBUMIN SERPL-MCNC: 3.7 G/DL (ref 3.5–5.2)
ALBUMIN/GLOB SERPL: 1.1 G/DL
ALP SERPL-CCNC: 97 U/L (ref 39–117)
ALT SERPL W P-5'-P-CCNC: 8 U/L (ref 1–33)
ANION GAP SERPL CALCULATED.3IONS-SCNC: 10.9 MMOL/L (ref 5–15)
APTT PPP: 39.6 SECONDS (ref 22.7–35.4)
AST SERPL-CCNC: 15 U/L (ref 1–32)
BACTERIA UR QL AUTO: ABNORMAL /HPF
BASOPHILS # BLD AUTO: 0.04 10*3/MM3 (ref 0–0.2)
BASOPHILS NFR BLD AUTO: 0.6 % (ref 0–1.5)
BILIRUB SERPL-MCNC: 0.7 MG/DL (ref 0–1.2)
BILIRUB UR QL STRIP: NEGATIVE
BUN SERPL-MCNC: 21 MG/DL (ref 8–23)
BUN/CREAT SERPL: 18.9 (ref 7–25)
CALCIUM SPEC-SCNC: 8.9 MG/DL (ref 8.6–10.5)
CHLORIDE SERPL-SCNC: 104 MMOL/L (ref 98–107)
CLARITY UR: ABNORMAL
CO2 SERPL-SCNC: 24.1 MMOL/L (ref 22–29)
COLOR UR: YELLOW
CREAT SERPL-MCNC: 1.11 MG/DL (ref 0.57–1)
DEPRECATED RDW RBC AUTO: 47.7 FL (ref 37–54)
EGFRCR SERPLBLD CKD-EPI 2021: 52.3 ML/MIN/1.73
EOSINOPHIL # BLD AUTO: 0.16 10*3/MM3 (ref 0–0.4)
EOSINOPHIL NFR BLD AUTO: 2.4 % (ref 0.3–6.2)
ERYTHROCYTE [DISTWIDTH] IN BLOOD BY AUTOMATED COUNT: 13.2 % (ref 12.3–15.4)
GEN 5 2HR TROPONIN T REFLEX: 12 NG/L
GLOBULIN UR ELPH-MCNC: 3.3 GM/DL
GLUCOSE SERPL-MCNC: 87 MG/DL (ref 65–99)
GLUCOSE UR STRIP-MCNC: NEGATIVE MG/DL
HCT VFR BLD AUTO: 39.6 % (ref 34–46.6)
HGB BLD-MCNC: 12.6 G/DL (ref 12–15.9)
HGB UR QL STRIP.AUTO: NEGATIVE
HYALINE CASTS UR QL AUTO: ABNORMAL /LPF
IMM GRANULOCYTES # BLD AUTO: 0.01 10*3/MM3 (ref 0–0.05)
IMM GRANULOCYTES NFR BLD AUTO: 0.1 % (ref 0–0.5)
INR PPP: 1.28 (ref 0.9–1.1)
KETONES UR QL STRIP: NEGATIVE
LEUKOCYTE ESTERASE UR QL STRIP.AUTO: ABNORMAL
LYMPHOCYTES # BLD AUTO: 1.12 10*3/MM3 (ref 0.7–3.1)
LYMPHOCYTES NFR BLD AUTO: 16.6 % (ref 19.6–45.3)
MCH RBC QN AUTO: 30.8 PG (ref 26.6–33)
MCHC RBC AUTO-ENTMCNC: 31.8 G/DL (ref 31.5–35.7)
MCV RBC AUTO: 96.8 FL (ref 79–97)
MONOCYTES # BLD AUTO: 0.64 10*3/MM3 (ref 0.1–0.9)
MONOCYTES NFR BLD AUTO: 9.5 % (ref 5–12)
NEUTROPHILS NFR BLD AUTO: 4.79 10*3/MM3 (ref 1.7–7)
NEUTROPHILS NFR BLD AUTO: 70.8 % (ref 42.7–76)
NITRITE UR QL STRIP: NEGATIVE
NRBC BLD AUTO-RTO: 0 /100 WBC (ref 0–0.2)
PH UR STRIP.AUTO: >=9 [PH] (ref 5–8)
PLATELET # BLD AUTO: 247 10*3/MM3 (ref 140–450)
PMV BLD AUTO: 11 FL (ref 6–12)
POTASSIUM SERPL-SCNC: 4 MMOL/L (ref 3.5–5.2)
PROT SERPL-MCNC: 7 G/DL (ref 6–8.5)
PROT UR QL STRIP: NEGATIVE
PROTHROMBIN TIME: 16.2 SECONDS (ref 11.7–14.2)
QT INTERVAL: 435 MS
QTC INTERVAL: 453 MS
RBC # BLD AUTO: 4.09 10*6/MM3 (ref 3.77–5.28)
RBC # UR STRIP: ABNORMAL /HPF
REF LAB TEST METHOD: ABNORMAL
SODIUM SERPL-SCNC: 139 MMOL/L (ref 136–145)
SP GR UR STRIP: 1.01 (ref 1–1.03)
SQUAMOUS #/AREA URNS HPF: ABNORMAL /HPF
TROPONIN T DELTA: 2 NG/L
TROPONIN T SERPL HS-MCNC: 10 NG/L
UROBILINOGEN UR QL STRIP: ABNORMAL
WBC # UR STRIP: ABNORMAL /HPF
WBC NRBC COR # BLD AUTO: 6.76 10*3/MM3 (ref 3.4–10.8)

## 2024-09-27 PROCEDURE — 85025 COMPLETE CBC W/AUTO DIFF WBC: CPT | Performed by: EMERGENCY MEDICINE

## 2024-09-27 PROCEDURE — 81001 URINALYSIS AUTO W/SCOPE: CPT | Performed by: EMERGENCY MEDICINE

## 2024-09-27 PROCEDURE — 87186 SC STD MICRODIL/AGAR DIL: CPT | Performed by: EMERGENCY MEDICINE

## 2024-09-27 PROCEDURE — 71045 X-RAY EXAM CHEST 1 VIEW: CPT

## 2024-09-27 PROCEDURE — 85610 PROTHROMBIN TIME: CPT | Performed by: EMERGENCY MEDICINE

## 2024-09-27 PROCEDURE — 87077 CULTURE AEROBIC IDENTIFY: CPT | Performed by: EMERGENCY MEDICINE

## 2024-09-27 PROCEDURE — 93005 ELECTROCARDIOGRAM TRACING: CPT | Performed by: EMERGENCY MEDICINE

## 2024-09-27 PROCEDURE — 87086 URINE CULTURE/COLONY COUNT: CPT | Performed by: EMERGENCY MEDICINE

## 2024-09-27 PROCEDURE — 84484 ASSAY OF TROPONIN QUANT: CPT | Performed by: EMERGENCY MEDICINE

## 2024-09-27 PROCEDURE — 93010 ELECTROCARDIOGRAM REPORT: CPT | Performed by: INTERNAL MEDICINE

## 2024-09-27 PROCEDURE — 99284 EMERGENCY DEPT VISIT MOD MDM: CPT

## 2024-09-27 PROCEDURE — 85730 THROMBOPLASTIN TIME PARTIAL: CPT | Performed by: EMERGENCY MEDICINE

## 2024-09-27 PROCEDURE — 72125 CT NECK SPINE W/O DYE: CPT

## 2024-09-27 PROCEDURE — 70450 CT HEAD/BRAIN W/O DYE: CPT

## 2024-09-27 PROCEDURE — 36415 COLL VENOUS BLD VENIPUNCTURE: CPT

## 2024-09-27 PROCEDURE — 80053 COMPREHEN METABOLIC PANEL: CPT | Performed by: EMERGENCY MEDICINE

## 2024-09-27 RX ORDER — CEFUROXIME AXETIL 250 MG/1
250 TABLET ORAL 2 TIMES DAILY
Qty: 14 TABLET | Refills: 0 | Status: SHIPPED | OUTPATIENT
Start: 2024-09-27 | End: 2024-10-04

## 2024-09-27 RX ORDER — CEFUROXIME AXETIL 250 MG/1
250 TABLET ORAL 2 TIMES DAILY
Qty: 14 TABLET | Refills: 0 | Status: SHIPPED | OUTPATIENT
Start: 2024-09-27 | End: 2024-09-27

## 2024-09-27 RX ORDER — CEFUROXIME AXETIL 250 MG/1
250 TABLET ORAL ONCE
Status: COMPLETED | OUTPATIENT
Start: 2024-09-27 | End: 2024-09-27

## 2024-09-27 RX ORDER — SODIUM CHLORIDE 0.9 % (FLUSH) 0.9 %
10 SYRINGE (ML) INJECTION AS NEEDED
Status: DISCONTINUED | OUTPATIENT
Start: 2024-09-27 | End: 2024-09-27 | Stop reason: HOSPADM

## 2024-09-27 RX ADMIN — CEFUROXIME AXETIL 250 MG: 250 TABLET ORAL at 16:26

## 2024-09-27 NOTE — ED NOTES
Patient arrives via [facility] for an unwitnessed fall. Patient is from [facility]. Patient told staff she hit her head on the towel rack. Patient complains of lower back pain. Patient takes Xarelto. Unsure if patient lost consciousness. Patient has a history of dementia. Alert and oriented x2. Sister is POA (see paperwork).

## 2024-09-27 NOTE — CASE MANAGEMENT/SOCIAL WORK
Discharge Planning Assessment  [facility]     Patient Name: Darlin Oconnor  MRN: [id number]  Today's Date: 9/27/2024    Admit Date: 9/27/2024        Discharge Needs Assessment    No documentation.                  Discharge Plan       Row Name 09/27/24 0190       Plan    Plan Comments Call out to [facility] to request transport back to [facility].  Patient transport scheduled for 2000.  Primary RN notified.  ISABELL Zapata RN                  Continued Care and Services - Admitted Since 9/27/2024    No active coordination exists for this encounter.          Demographic Summary    No documentation.                  Functional Status    No documentation.                  Psychosocial    No documentation.                  Abuse/Neglect    No documentation.                  Legal    No documentation.                  Substance Abuse    No documentation.                  Patient Forms    No documentation.                     Amy Gray, RN

## 2024-09-27 NOTE — ED PROVIDER NOTES
EMERGENCY DEPARTMENT ENCOUNTER  Room Number:  36/36  PCP: Loni Pratt APRN  Independent Historians: Patient      HPI:  Chief Complaint: had concerns including Fall.     A complete HPI/ROS/PMH/PSH/SH/FH are unobtainable due to: Dementia    Chronic or social conditions impacting patient care (Social Determinants of Health): None      Context: Darlin Oconnor is a 73 y.o. female with a medical history of dementia, hyperlipidemia and chronic anticoagulation for history of DVT who presents to the ED c/o acute fall with injuries.  Patient reports she was standing up 1 second and on the floor the next.  She did hit the back of her head.  She does not recall losing consciousness but is not completely sure.  She is not having any chest pain or shortness of breath.  She reports she felt in her usual state of health this morning before the event and feels back to baseline at this time.  Other than a small sore spot on the back of her head she denies headache, chest pain, abdominal pain.  To me she denies any back pain, abdominal pain, extremity injuries or pain.      Review of prior external notes (non-ED) -and- Review of prior external test results outside of this encounter:        PAST MEDICAL HISTORY  Active Ambulatory Problems     Diagnosis Date Noted    No Active Ambulatory Problems     Resolved Ambulatory Problems     Diagnosis Date Noted    No Resolved Ambulatory Problems     No Additional Past Medical History         PAST SURGICAL HISTORY  No past surgical history on file.      FAMILY HISTORY  No family history on file.      SOCIAL HISTORY  Social History     Socioeconomic History    Marital status:          ALLERGIES  Tetracyclines & related      REVIEW OF SYSTEMS  Review of Systems  Included in HPI  All systems reviewed and negative except for those discussed in HPI.      PHYSICAL EXAM    I have reviewed the triage vital signs and nursing notes.    ED Triage Vitals [09/27/24 1326]   Temp Heart Rate  Resp BP SpO2   98.5 °F (36.9 °C) 73 16 134/68 99 %      Temp src Heart Rate Source Patient Position BP Location FiO2 (%)   -- -- -- -- --       Physical Exam    Physical Exam   Constitutional: No distress.  Nontoxic  HENT:  Head: Normocephalic and atraumatic.  No depressed skull fracture or large hematoma identified.  Oropharynx: Mucous membranes are moist.   Eyes: . No scleral icterus. No conjunctival pallor.  Neck: Normal range of motion. Neck supple.  No midline tenderness to palpation or step-off.  C5-8 motor and sensory grossly intact  Cardiovascular: Pink warm and well perfused throughout.    Pulmonary/Chest: No respiratory distress.  No tachypnea or increased work of breathing appreciated.    Abdominal: Soft. There is no tenderness. There is no rebound and no guarding.  Not abdominal exam  Musculoskeletal: Moves all extremities equally.  Atraumatic extremity exam with no deformity noted.  No midline tenderness to palpation of the lumbar or thoracic spine with no external findings of trauma noted either.  Neurological: Alert and oriented.  No acute focal deficit appreciated.  Skin: Skin is pink, warm, and dry.   Psychiatric: Mood and affect normal.   Nursing note and vitals reviewed.             LAB RESULTS  Recent Results (from the past 24 hour(s))   ECG 12 Lead Syncope    Collection Time: 09/27/24  1:48 PM   Result Value Ref Range    QT Interval 435 ms    QTC Interval 453 ms   Comprehensive Metabolic Panel    Collection Time: 09/27/24  1:58 PM    Specimen: Blood   Result Value Ref Range    Glucose 87 65 - 99 mg/dL    BUN 21 8 - 23 mg/dL    Creatinine 1.11 (H) 0.57 - 1.00 mg/dL    Sodium 139 136 - 145 mmol/L    Potassium 4.0 3.5 - 5.2 mmol/L    Chloride 104 98 - 107 mmol/L    CO2 24.1 22.0 - 29.0 mmol/L    Calcium 8.9 8.6 - 10.5 mg/dL    Total Protein 7.0 6.0 - 8.5 g/dL    Albumin 3.7 3.5 - 5.2 g/dL    ALT (SGPT) 8 1 - 33 U/L    AST (SGOT) 15 1 - 32 U/L    Alkaline Phosphatase 97 39 - 117 U/L    Total  Bilirubin 0.7 0.0 - 1.2 mg/dL    Globulin 3.3 gm/dL    A/G Ratio 1.1 g/dL    BUN/Creatinine Ratio 18.9 7.0 - 25.0    Anion Gap 10.9 5.0 - 15.0 mmol/L    eGFR 52.3 (L) >60.0 mL/min/1.73   Protime-INR    Collection Time: 09/27/24  1:58 PM    Specimen: Blood   Result Value Ref Range    Protime 16.2 (H) 11.7 - 14.2 Seconds    INR 1.28 (H) 0.90 - 1.10   aPTT    Collection Time: 09/27/24  1:58 PM    Specimen: Blood   Result Value Ref Range    PTT 39.6 (H) 22.7 - 35.4 seconds   High Sensitivity Troponin T    Collection Time: 09/27/24  1:58 PM    Specimen: Blood   Result Value Ref Range    HS Troponin T 10 <14 ng/L   CBC Auto Differential    Collection Time: 09/27/24  1:58 PM    Specimen: Blood   Result Value Ref Range    WBC 6.76 3.40 - 10.80 10*3/mm3    RBC 4.09 3.77 - 5.28 10*6/mm3    Hemoglobin 12.6 12.0 - 15.9 g/dL    Hematocrit 39.6 34.0 - 46.6 %    MCV 96.8 79.0 - 97.0 fL    MCH 30.8 26.6 - 33.0 pg    MCHC 31.8 31.5 - 35.7 g/dL    RDW 13.2 12.3 - 15.4 %    RDW-SD 47.7 37.0 - 54.0 fl    MPV 11.0 6.0 - 12.0 fL    Platelets 247 140 - 450 10*3/mm3    Neutrophil % 70.8 42.7 - 76.0 %    Lymphocyte % 16.6 (L) 19.6 - 45.3 %    Monocyte % 9.5 5.0 - 12.0 %    Eosinophil % 2.4 0.3 - 6.2 %    Basophil % 0.6 0.0 - 1.5 %    Immature Grans % 0.1 0.0 - 0.5 %    Neutrophils, Absolute 4.79 1.70 - 7.00 10*3/mm3    Lymphocytes, Absolute 1.12 0.70 - 3.10 10*3/mm3    Monocytes, Absolute 0.64 0.10 - 0.90 10*3/mm3    Eosinophils, Absolute 0.16 0.00 - 0.40 10*3/mm3    Basophils, Absolute 0.04 0.00 - 0.20 10*3/mm3    Immature Grans, Absolute 0.01 0.00 - 0.05 10*3/mm3    nRBC 0.0 0.0 - 0.2 /100 WBC   Urinalysis With Microscopic If Indicated (No Culture) - Urine, Clean Catch    Collection Time: 09/27/24  3:08 PM    Specimen: Urine, Clean Catch   Result Value Ref Range    Color, UA Yellow Yellow, Straw    Appearance, UA Cloudy (A) Clear    pH, UA >=9.0 (H) 5.0 - 8.0    Specific Gravity, UA 1.010 1.005 - 1.030    Glucose, UA Negative Negative     Ketones, UA Negative Negative    Bilirubin, UA Negative Negative    Blood, UA Negative Negative    Protein, UA Negative Negative    Leuk Esterase, UA Trace (A) Negative    Nitrite, UA Negative Negative    Urobilinogen, UA 1.0 E.U./dL 0.2 - 1.0 E.U./dL   Urinalysis, Microscopic Only - Urine, Clean Catch    Collection Time: 09/27/24  3:08 PM    Specimen: Urine, Clean Catch   Result Value Ref Range    RBC, UA 3-5 (A) None Seen, 0-2 /HPF    WBC, UA 3-5 (A) None Seen, 0-2 /HPF    Bacteria, UA 4+ (A) None Seen /HPF    Squamous Epithelial Cells, UA 7-12 (A) None Seen, 0-2 /HPF    Hyaline Casts, UA 0-2 None Seen /LPF    Methodology Automated Microscopy          RADIOLOGY  CT Head Without Contrast, CT Cervical Spine Without Contrast    Result Date: 9/27/2024  EMERGENCY CT SCAN OF THE HEAD AND CERVICAL SPINE WITHOUT CONTRAST ON 09/27/2024  CLINICAL HISTORY: This is a 73-year-old female patient who fell and hit her head on a towel rack. The patient is on blood thinners.  HEAD CT TECHNIQUE: Spiral CT images were obtained from the base of the skull to the vertex without intravenous contrast. The images were reformatted and are submitted in 3 mm thick axial, sagittal and coronal CT sections with brain algorithm and 2 mm thick axial CT sections with high resolution bone algorithm.  There are no prior head CTs from [facility] for comparison.  FINDINGS: There are extensive patchy and confluent areas of low-density throughout the periventricular and subcortical white matter of the cerebral hemispheres, consistent with severe small vessel disease. There is an 8 x 6 mm old lacunar infarct in the central to lateral left thalamus, and a 6 x 4 mm old lacunar infarct in the medial right thalamus. The remainder of the brain parenchyma is normal in attenuation. There is moderate diffuse cerebral atrophy. The lateral and the third ventricles are large in size. This is most probably on the basis of central volume loss or  atrophy. I see no focal mass effect. There is no midline shift. No extra-axial fluid collections are identified. There is no evidence of acute intracranial hemorrhage. No acute skull fracture is identified. The calvarium and the skull base are normal in appearance. The paranasal sinuses and the mastoid air cells and the middle ear cavities are clear.       1. No acute intracranial abnormality is identified.  2. There is extensive confluent low-density throughout the cerebral white matter, consistent with severe small vessel disease. There is an 8 x 6 mm old lacunar infarct in the central to lateral aspect of the left thalamus, and a 6 x 4 mm old lacunar infarct in the posterior inferior medial right thalamus. There is moderate diffuse cerebral atrophy and the lateral and third ventricles are large in size and this is most probably on the basis of prominent central volume loss or atrophy. The remainder of the head CT is within normal limits with no acute skull fracture or intracranial hemorrhage identified.  CERVICAL SPINE TECHNIQUE: Spiral CT images were obtained from the base of the skull down to the T1-T2 thoracic level. Images were reformatted and are submitted in 2 mm thick axial and sagittal CT sections with soft tissue algorithm, and 1 mm thick axial, sagittal and coronal CT sections with high-resolution bone algorithm.  There are no prior cervical spine imaging studies from [facility] for comparison.  FINDINGS: The atlantooccipital articulation is within normal limits.  At C1-C2, there are arthritic changes at the atlantodental interval. Otherwise, the C1-C2 level is normal in appearance.  At C2-C3, there is minimal posterior disc bulge. The facets and uncovertebral joints are normal. There is no canal or foraminal narrowing.  At C3-C4, there is moderate-to-severe left facet overgrowth and only minimal right facet overgrowth, and there is 1 mm anterolisthesis of C3 on C4 and a posterior central  disc bulge. There is essentially no canal narrowing. There is minimal left and no right foraminal narrowing.  At C4-C5, there is moderate-to-severe left facet overgrowth and mild right facet overgrowth, and there is a 2 mm degenerative anterolisthesis of C4 on C5. Left paracentral disc osteophyte complex mildly narrowing the left side of the canal. There is no foraminal narrowing.  At C5-C6, there is moderate disc space narrowing and there are degenerative endplate changes, and there is a mild diffuse posterior disc osteophyte complex that mildly narrows the left side of the canal. The facets and uncovertebral joints are normal and there is no foraminal narrowing.  At C6-C7, there is moderate disc space narrowing. There are degenerative endplate changes and mild diffuse posterior disc osteophyte complex with minimal canal narrowing. The facets are normal. There is only minimal uncovertebral joint hypertrophy. There is no foraminal narrowing.  At C7-T1, there is mild-to-moderate bilateral facet overgrowth and a 1 mm anterolisthesis of C7 on T1. There is no canal or foraminal narrowing.  No acute fracture is seen in the cervical spine.  IMPRESSION: No acute fracture is seen in the cervical spine. There is cervical spondylosis as described above.  Radiation dose reduction techniques were utilized, including automated exposure control and exposure modulation based on body size.   This report was finalized on 9/27/2024 3:22 PM by Dr. Benny Rosales M.D on Workstation: [id number]      XR Chest 1 View    Result Date: 9/27/2024  XR CHEST 1 VW-  HISTORY: Female who is 73 years-old, possible syncope  TECHNIQUE: Frontal view of the chest  COMPARISON: None available  FINDINGS: Heart, mediastinum and pulmonary vasculature are unremarkable. Minimal likely scarring or atelectasis at the the peripheral right midlung. No focal pulmonary consolidation, pleural effusion, or pneumothorax. Right hemidiaphragm is elevated. No acute  osseous process.      As described.  This report was finalized on 9/27/2024 2:10 PM by Dr. Bin Obrien M.D on Workstation: [id number]         MEDICATIONS GIVEN IN ER  Medications   sodium chloride 0.9 % flush 10 mL (has no administration in time range)   cefuroxime (CEFTIN) tablet 250 mg (has no administration in time range)         ORDERS PLACED DURING THIS VISIT:  Orders Placed This Encounter   Procedures    Urine Culture - Urine,    CT Head Without Contrast    CT Cervical Spine Without Contrast    XR Chest 1 View    Comprehensive Metabolic Panel    Protime-INR    aPTT    High Sensitivity Troponin T    CBC Auto Differential    Urinalysis With Microscopic If Indicated (No Culture) - Urine, Clean Catch    High Sensitivity Troponin T 2Hr    Urinalysis, Microscopic Only - Urine, Clean Catch    Monitor Blood Pressure    Continuous Pulse Oximetry    ECG 12 Lead Syncope    Insert Peripheral IV    CBC & Differential         OUTPATIENT MEDICATION MANAGEMENT:  Current Facility-Administered Medications Ordered in Epic   Medication Dose Route Frequency Provider Last Rate Last Admin    cefuroxime (CEFTIN) tablet 250 mg  250 mg Oral Once Zeferino Rush MD        sodium chloride 0.9 % flush 10 mL  10 mL Intravenous PRN Zeferino Rush MD         Current Outpatient Medications Ordered in Epic   Medication Sig Dispense Refill    cefuroxime (CEFTIN) 250 MG tablet Take 1 tablet by mouth 2 (Two) Times a Day for 7 days. 14 tablet 0         PROCEDURES  Procedures            PROGRESS, DATA ANALYSIS, CONSULTS, AND MEDICAL DECISION MAKING  All labs have been independently interpreted by me.  All radiology studies have been reviewed by me. All EKG's have been independently viewed and interpreted by me.  Discussion below represents my analysis of pertinent findings related to patient's condition, differential diagnosis, treatment plan and final disposition.    Differential diagnosis:   My differential diagnosis includes but is not  limited to cerebral contusion, cervical strain, concussion with LOC, concussion without LOC, contusion, fracture of the skull, orbits or mandible, hematoma, intracranial hemorrhage including subdural, epidural, subarachnoid and intracerebral, laceration and postconcussion syndrome      Clinical Scores:                  ED Course as of 09/27/24 1544   Fri Sep 27, 2024   1402 EKG           EKG time: 1348  Rhythm/Rate: Sinus, 65  P waves and [place]: TAMAR within normal limits  QRS, axis: IVCD consistent with left bundle branch block  ST and T waves: No STEMI; QTc within normal limits    Interpreted Contemporaneously by me, independently viewed  Comparison: Unavailable   [RS]   1430 WBC: 6.76 [RS]   1431 Hemoglobin: 12.6 [RS]   1431 Platelets: 247 [RS]   1431 Immature Grans, Absolute: 0.01 [RS]   1431 RADIOLOGY      Study: Single view chest  Findings: No large volume infiltrate  I independently viewed and interpreted these images contemporaneously with treatment.    [RS]   1454 CONSULT        Provider: Dr. Rosales-neuroradiologist    Discussion: No acute intracranial pathology or acute fracture/sublux of the cervical spine identified.  Significant atrophy and small vessel disease    Agreeable c treatment and planned disposition.         [RS]   1454 HS Troponin T: 10 [RS]   1454 Glucose: 87 [RS]   1454 BUN: 21 [RS]   1454 Creatinine(!): 1.11 [RS]   1454 Sodium: 139 [RS]   1454 Potassium: 4.0 [RS]   1529 Patient feeling back to baseline at this time.  Plan delta troponin and urinalysis before disposition is set. [RS]   1543 Leukocytes, UA(!): Trace [RS]   1543 Nitrite, UA: Negative [RS]   1543 WBC, UA(!): 3-5 [RS]   1543 Bacteria, UA(!): 4+ [RS]   1543 Squamous Epithelial Cells, UA(!): 7-12 [RS]   1543 We will send the urine for culture but recommend treatment. [RS]   1544 I reviewed the patient history, ED presentation and findings on examination and testing with SHIELA Ordaz who is agreeable to follow-up on repeat troponin and  make final disposition for this patient. [RS]      ED Course User Index  [RS] Zeferino Rush MD         Prescription drug monitoring program review:     AS OF 15:44 EDT VITALS:    BP - 135/77  HR - 67  TEMP - 98.5 °F (36.9 °C)  O2 SATS - 97%    COMPLEXITY OF CARE  Admission was considered but after careful review of the patient's presentation, physical examination, diagnostic results, and response to treatment the patient may be safely discharged with outpatient follow-up.      DIAGNOSIS  Final diagnoses:   Fall at nursing home, initial encounter   Injury of head, initial encounter   Chronic anticoagulation   Chronic kidney disease, unspecified CKD stage   Acute UTI         DISPOSITION  ED Disposition       None               DISCHARGE    Patient discharged in stable condition.    Reviewed implications of results, diagnosis, meds, responsibility to follow up, warning signs and symptoms of possible worsening, potential complications and reasons to return to ER.    Patient/Family voiced understanding of above instructions.    Discussed plan for discharge, as there is no emergent indication for admission. Patient referred to primary care provider for regular health maintenance. Pt/family is agreeable and understands need for follow up and possible repeat testing.  Pt is aware that discharge does not mean that nothing is wrong but it indicates no emergency is present that requires admission and they must continue care with follow-up as given below or physician of their choice.     FOLLOW-UP  Loni Pratt, SANIA  [address] [id number] [phone]    Schedule an appointment as soon as possible for a visit   As needed         Medication List        New Prescriptions      cefuroxime 250 MG tablet  Commonly known as: CEFTIN  Take 1 tablet by mouth 2 (Two) Times a Day for 7 days.               Where to Get Your Medications        You can get these medications from any pharmacy    Bring a  paper prescription for each of these medications  cefuroxime 250 MG tablet           Please note that portions of this document were completed with a voice recognition program.    Note Disclaimer: At [facility], we believe that sharing information builds trust and better relationships. You are receiving this note because you recently visited [facility]. It is possible you will see health information before a provider has talked with you about it. This kind of information can be easy to misunderstand. To help you fully understand what it means for your health, we urge you to discuss this note with your provider.         Zeferino Rush MD  09/27/24 7620

## 2024-09-29 LAB — BACTERIA SPEC AEROBE CULT: ABNORMAL

## 2025-03-24 ENCOUNTER — TRANSCRIBE ORDERS (OUTPATIENT)
Dept: ADMINISTRATIVE | Facility: HOSPITAL | Age: 75
End: 2025-03-24
Payer: MEDICARE

## 2025-03-24 DIAGNOSIS — H54.7 LOSS OF VISION: Primary | ICD-10-CM

## 2025-04-24 ENCOUNTER — HOSPITAL ENCOUNTER (OUTPATIENT)
Dept: MRI IMAGING | Facility: HOSPITAL | Age: 75
Discharge: HOME | End: 2025-04-24
Admitting: OPHTHALMOLOGY
Payer: MEDICARE

## 2025-04-24 DIAGNOSIS — H54.7 LOSS OF VISION: ICD-10-CM

## 2025-04-24 PROCEDURE — A9577 INJ MULTIHANCE: HCPCS | Performed by: OPHTHALMOLOGY

## 2025-04-24 PROCEDURE — 70553 MRI BRAIN STEM W/O & W/DYE: CPT

## 2025-04-24 PROCEDURE — 25510000002 GADOBENATE DIMEGLUMINE 529 MG/ML SOLUTION: Performed by: OPHTHALMOLOGY

## 2025-04-24 RX ADMIN — GADOBENATE DIMEGLUMINE 17 ML: 529 INJECTION, SOLUTION INTRAVENOUS at 13:54
